# Patient Record
Sex: MALE | Race: WHITE | Employment: FULL TIME | URBAN - METROPOLITAN AREA
[De-identification: names, ages, dates, MRNs, and addresses within clinical notes are randomized per-mention and may not be internally consistent; named-entity substitution may affect disease eponyms.]

---

## 2019-06-14 ENCOUNTER — OFFICE VISIT (OUTPATIENT)
Dept: PRIMARY CARE CLINIC | Age: 29
End: 2019-06-14

## 2019-06-14 VITALS
WEIGHT: 203.4 LBS | BODY MASS INDEX: 27.55 KG/M2 | RESPIRATION RATE: 16 BRPM | DIASTOLIC BLOOD PRESSURE: 92 MMHG | TEMPERATURE: 98 F | HEIGHT: 72 IN | OXYGEN SATURATION: 95 % | SYSTOLIC BLOOD PRESSURE: 135 MMHG | HEART RATE: 76 BPM

## 2019-06-14 DIAGNOSIS — Z76.89 ENCOUNTER TO ESTABLISH CARE: ICD-10-CM

## 2019-06-14 DIAGNOSIS — F41.9 ANXIETY: Primary | ICD-10-CM

## 2019-06-14 DIAGNOSIS — Z88.9 H/O SEASONAL ALLERGIES: ICD-10-CM

## 2019-06-14 RX ORDER — CLONAZEPAM 0.25 MG/1
0.25 TABLET, ORALLY DISINTEGRATING ORAL
COMMUNITY
Start: 2019-04-08 | End: 2019-06-14 | Stop reason: SDUPTHER

## 2019-06-14 RX ORDER — ESCITALOPRAM OXALATE 20 MG/1
TABLET ORAL
Qty: 90 TAB | Refills: 1 | Status: SHIPPED | OUTPATIENT
Start: 2019-06-14 | End: 2020-06-12 | Stop reason: SDUPTHER

## 2019-06-14 RX ORDER — CLONAZEPAM 0.25 MG/1
TABLET, ORALLY DISINTEGRATING ORAL
Qty: 10 TAB | Refills: 0 | Status: SHIPPED | OUTPATIENT
Start: 2019-06-14 | End: 2020-03-19 | Stop reason: SDUPTHER

## 2019-06-14 RX ORDER — PROPRANOLOL HYDROCHLORIDE 10 MG/1
TABLET ORAL
COMMUNITY
Start: 2018-09-28 | End: 2020-03-19 | Stop reason: SDUPTHER

## 2019-06-14 RX ORDER — ESCITALOPRAM OXALATE 20 MG/1
TABLET ORAL
COMMUNITY
Start: 2018-01-08 | End: 2019-06-14 | Stop reason: SDUPTHER

## 2019-06-14 NOTE — PROGRESS NOTES
Chief Complaint   Patient presents with   [facility]     medication refill,  one is anxiety lexapro 20 mg

## 2019-06-14 NOTE — PROGRESS NOTES
[facility]   [address], [address] [facility]  P: [phone]  F: [phone]      Chief Complaint   Patient presents with   [facility]     medication refill,  one is anxiety lexapro 20 mg        SUBJECTIVE   Carmen Collado is a 34 y.o. male who presents to clinic for \[facility]\"" Care (medication refill,  one is anxiety lexapro 20 mg ). HPI:    Tom Hatch is a 34 yr old male who presents to [facility], with a hx of anxiety well managed on Lexapro 20mg and Clonopin . 25mg PRN for anxiety attack (only 2-3 pills monthly). He also takes propanolol 10mg tablet as needed for anxiety r/t public speaking. Previously followed by Donna Uribe with [facility] [place]. He provides his records today which confirm above medications. He states overall today that his anxiety is stable. He was previously followed by a  therapist in his early 25s and had cognitive behavioral therapy for about 6 to 8 months, but did not require any further therapy. He states his anxiety manifests as increased sweating, increased heart rate, and trouble with public speaking. He also notes anxiety when he is trapped in a small group with a lot of people. He has found that redirecting his thoughts is a good coping strategy for him. He denies further need for resources including therapy or psychiatry today. 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 Previously was in [place] and [place]. Non-smoker, drinks alcohol socially. He notes additionally has a history of seasonal allergies on daily Claritin, and uses Nasonex if he has sinus congestion. There are no active problems to display for this patient. History reviewed. No pertinent past medical history.   Past Surgical History:   Procedure Laterality Date    HX TONSIL AND ADENOIDECTOMY Bilateral      Social History     Socioeconomic History    Marital status: SINGLE     Spouse name: Not on file    Number of children: Not on file    Years of education: Not on file    Highest education level: Not on file   Occupational History    Not on file   Social Needs    Financial resource strain: Not on file    Food insecurity:     Worry: Not on file     Inability: Not on file    Transportation needs:     Medical: Not on file     Non-medical: Not on file   Tobacco Use    Smoking status: Former Smoker     Last attempt to quit: 2018     Years since quittin.9    Smokeless tobacco: Never Used   Substance and Sexual Activity    Alcohol use: Yes     Comment: twice a week    Drug use: Never    Sexual activity: Yes     Partners: Male     Birth control/protection: Condom   Lifestyle    Physical activity:     Days per week: Not on file     Minutes per session: Not on file    Stress: Not on file   Relationships    Social connections:     Talks on phone: Not on file     Gets together: Not on file     Attends Jewish service: Not on file     Active member of club or organization: Not on file     Attends meetings of clubs or organizations: Not on file     Relationship status: Not on file    Intimate partner violence:     Fear of current or ex partner: Not on file     Emotionally abused: Not on file     Physically abused: Not on file     Forced sexual activity: Not on file   Other Topics Concern    Not on file   Social History Narrative    Not on file     History reviewed. No pertinent family history. No Known Allergies    Current Outpatient Medications   Medication Sig Dispense Refill    propranolol (INDERAL) 10 mg tablet Take 1 -2 tablet(s) by oral route 1 hour prior to public speaking. May repeat every 6 hours.  clonazePAM (KLONOPIN) 0.25 mg disintegrating tablet Take 1 tab as needed for anxiety attack. Max dose . 5mg daily.  10 Tab 0    escitalopram oxalate (LEXAPRO) 20 mg tablet TAKE 1 TABLET BY MOUTH EVERY DAY  Indications: Repeated Episodes of Anxiety 90 Tab 1           The medications were reviewed and updated in the medical record. The past medical history, past surgical history, and family history were reviewed and updated in the medical record. REVIEW OF SYSTEMS   Review of Systems   Constitutional: Negative for malaise/fatigue. HENT: Negative for congestion. Eyes: Negative for blurred vision and pain. Respiratory: Negative for cough and shortness of breath. Cardiovascular: Negative for chest pain and palpitations. Gastrointestinal: Negative for abdominal pain and heartburn. Genitourinary: Negative for frequency and urgency. Musculoskeletal: Negative for joint pain and myalgias. Neurological: Negative for dizziness, tingling, sensory change, weakness and headaches. Psychiatric/Behavioral: Negative for depression, memory loss and substance abuse. PHYSICAL EXAM     Visit Vitals  BP (!) 135/92 (BP 1 Location: Left arm, BP Patient Position: Sitting)   Pulse 76   Temp 98 °F (36.7 °C) (Oral)   Resp 16   Ht 6' (1.829 m)   Wt 203 lb 6.4 oz (92.3 kg)   SpO2 95%   BMI 27.59 kg/m²     Physical Exam   Constitutional: He is oriented to person, place, and time and well-developed, well-nourished, and in no distress. BMI 27   HENT:   Head: Normocephalic and atraumatic. Right Ear: External ear normal.   Left Ear: External ear normal.   Cardiovascular: Normal rate, regular rhythm, S1 normal, S2 normal and normal heart sounds. Pulmonary/Chest: Effort normal and breath sounds normal.   Musculoskeletal: Normal range of motion. He exhibits no edema. Neurological: He is alert and oriented to person, place, and time. Gait normal.   Skin: Skin is warm and dry. Psychiatric: Mood, affect and judgment normal. He expresses no homicidal and no suicidal ideation. Nursing note and vitals reviewed. ASSESSMENT/ PLAN   Diagnoses and all orders for this visit:    1. Anxiety  -     clonazePAM (KLONOPIN) 0.25 mg disintegrating tablet; Take 1 tab as needed for anxiety attack. Max dose . 5mg daily.  -     escitalopram oxalate (LEXAPRO) 20 mg tablet; TAKE 1 TABLET BY MOUTH EVERY DAY  Indications: Repeated Episodes of Anxiety  -Encouraged him to let me know if his anxiety is worsening, as I have multiple resources to refer him to for psychiatry and counseling. Patient declines resources at this time. 2. Encounter to establish care     -Return for full physical with fasting labs    3. H/O seasonal allergies    -Continue to use Claritin and Nasonex    Monitor blood pressure as slightly elevated today 135/92. Disclaimer:  Advised patient to call back or return to office if symptoms worsen/change/persist.  Discussed expected course/resolution/complications of diagnosis in detail with patient.     Medication risks/benefits/alternatives discussed with patient. Patient was given an after visit summary which includes diagnoses, current medications, & vitals.      Discussed patient instructions and advised to read to all patient instructions regarding care.      Patient expressed understanding with the diagnosis and plan. This note will not be viewable in [address]         Haroldo Rodriguez NP  6/14/2019        (This document has been electronically signed)

## 2020-01-13 ENCOUNTER — OFFICE VISIT (OUTPATIENT)
Dept: PRIMARY CARE CLINIC | Age: 30
End: 2020-01-13

## 2020-01-13 VITALS
HEIGHT: 72 IN | OXYGEN SATURATION: 95 % | TEMPERATURE: 98.5 F | HEART RATE: 77 BPM | SYSTOLIC BLOOD PRESSURE: 125 MMHG | DIASTOLIC BLOOD PRESSURE: 92 MMHG | RESPIRATION RATE: 16 BRPM | WEIGHT: 200 LBS | BODY MASS INDEX: 27.09 KG/M2

## 2020-01-13 DIAGNOSIS — H92.01 RIGHT EAR PAIN: Primary | ICD-10-CM

## 2020-01-13 RX ORDER — CIPROFLOXACIN AND DEXAMETHASONE 3; 1 MG/ML; MG/ML
SUSPENSION/ DROPS AURICULAR (OTIC)
Qty: 7.5 ML | Refills: 0 | Status: SHIPPED | OUTPATIENT
Start: 2020-01-13 | End: 2021-03-09 | Stop reason: ALTCHOICE

## 2020-01-13 NOTE — PROGRESS NOTES
Chief Complaint   Patient presents with    Ear Pain     right ear and it has been goingf on for about 48 hours. was on the outer ear and now it is a ache inside     1. Have you been to an emergency room, urgent clinic, or hospitalized since your last visit? NO  If yes, where when, and reason for visit? 2. Have seen or consulted any other health care provider since your last visit? NO  Please include any pap smears or colon screening in this section  If yes, where when, and reason for visit? 3. Have you had any blood work or xray, including a mammogram since your last visit NO  If yes, where when, and reason for visit? 4. Are there any changes in medications including immunizations since your last visit? YES  If yes, where when, and reason for visit? Flu shot at work    5. Would you like to speak with a health care team member about safety in your home? NO    6. Do you have an Advanced Directive/ Living Will in place?  NO  If yes, do we have a copy on file NO  If no, would you like information NO

## 2020-01-13 NOTE — PROGRESS NOTES
[facility]   [address], [address] [facility]  P: [phone]  F: [phone]      Chief Complaint   Patient presents with    Ear Pain     right ear and it has been goingf on for about 48 hours. was on the outer ear and now it is a ache inside       SUBJECTIVE   Cong Powell is a 34 y.o. male who presents to clinic for Ear Pain (right ear and it has been goingf on for about 48 hours. was on the outer ear and now it is a ache inside). HPI:    Mikhail Velasquez is a 34 yr old male who presents with 2 days of ear- ache. He denies any other associated symptoms today. He reports he tried 6 ibuprofen 400 mg with relief in pain. He notes a prior history of allergies, and reports he stayed at his parents over the holidays who have a wood-burning stove. There are no active problems to display for this patient. History reviewed. No pertinent past medical history.   Past Surgical History:   Procedure Laterality Date    HX TONSIL AND ADENOIDECTOMY Bilateral      Social History     Socioeconomic History    Marital status: SINGLE     Spouse name: Not on file    Number of children: Not on file    Years of education: Not on file    Highest education level: Not on file   Occupational History    Not on file   Social Needs    Financial resource strain: Not on file    Food insecurity:     Worry: Not on file     Inability: Not on file    Transportation needs:     Medical: Not on file     Non-medical: Not on file   Tobacco Use    Smoking status: Former Smoker     Last attempt to quit: 2018     Years since quittin.5    Smokeless tobacco: Never Used   Substance and Sexual Activity    Alcohol use: Yes     Comment: twice a week    Drug use: Never    Sexual activity: Yes     Partners: Male     Birth control/protection: Condom   Lifestyle    Physical activity:     Days per week: Not on file     Minutes per session: Not on file    Stress: Not on file   Relationships    Social connections:     Talks on phone: Not on file     Gets together: Not on file     Attends Sikhism service: Not on file     Active member of club or organization: Not on file     Attends meetings of clubs or organizations: Not on file     Relationship status: Not on file    Intimate partner violence:     Fear of current or ex partner: Not on file     Emotionally abused: Not on file     Physically abused: Not on file     Forced sexual activity: Not on file   Other Topics Concern    Not on file   Social History Narrative    Not on file     History reviewed. No pertinent family history. No Known Allergies    Current Outpatient Medications   Medication Sig Dispense Refill    ciprofloxacin-dexamethasone (CIPRODEX) 0.3-0.1 % otic suspension 3-4 drops twice daily for one week. 7.5 mL 0    propranolol (INDERAL) 10 mg tablet Take 1 -2 tablet(s) by oral route 1 hour prior to public speaking. May repeat every 6 hours.  escitalopram oxalate (LEXAPRO) 20 mg tablet TAKE 1 TABLET BY MOUTH EVERY DAY  Indications: Repeated Episodes of Anxiety 90 Tab 1    clonazePAM (KLONOPIN) 0.25 mg disintegrating tablet Take 1 tab as needed for anxiety attack. Max dose . 5mg daily. 10 Tab 0           The medications were reviewed and updated in the medical record. The past medical history, past surgical history, and family history were reviewed and updated in the medical record. REVIEW OF SYSTEMS   Review of Systems   Constitutional: Negative for malaise/fatigue. HENT: Positive for ear pain. Negative for congestion. Eyes: Negative for blurred vision and pain. Respiratory: Negative for cough and shortness of breath. Cardiovascular: Negative for chest pain and palpitations. Gastrointestinal: Negative for abdominal pain and heartburn. Genitourinary: Negative for frequency and urgency. Musculoskeletal: Negative for joint pain and myalgias.    Neurological: Negative for dizziness, tingling, sensory change, weakness and headaches. Psychiatric/Behavioral: Negative for depression, memory loss and substance abuse. PHYSICAL EXAM     Visit Vitals  BP (!) 125/92 (BP 1 Location: Left arm, BP Patient Position: Sitting)   Pulse 77   Temp 98.5 °F (36.9 °C) (Oral)   Resp 16   Ht 6' (1.829 m)   Wt 200 lb (90.7 kg)   SpO2 95%   BMI 27.12 kg/m²       Physical Exam  Vitals signs and nursing note reviewed. HENT:      Head: Normocephalic and atraumatic. Right Ear: External ear normal. Swelling (canal) present. Tympanic membrane is not erythematous. Left Ear: Hearing, tympanic membrane, ear canal and external ear normal.   Cardiovascular:      Rate and Rhythm: Normal rate and regular rhythm. Heart sounds: Normal heart sounds. Pulmonary:      Effort: Pulmonary effort is normal.      Breath sounds: Normal breath sounds. Musculoskeletal: Normal range of motion. Skin:     General: Skin is warm and dry. Neurological:      Mental Status: He is alert and oriented to person, place, and time. Gait: Gait is intact. Psychiatric:         Mood and Affect: Affect normal.         Judgment: Judgment normal.       ASSESSMENT/ PLAN   Diagnoses and all orders for this visit:    1. Right ear pain  -     ciprofloxacin-dexamethasone (CIPRODEX) 0.3-0.1 % otic suspension; 3-4 drops twice daily for one week. -Start antihistamine like Zyrtec or Claritin. Discussed if not improving, may require oral antibiotics. Disclaimer:  Advised patient to call back or return to office if symptoms worsen/change/persist.  Discussed expected course/resolution/complications of diagnosis in detail with patient.     Medication risks/benefits/alternatives discussed with patient. Patient was given an after visit summary which includes diagnoses, current medications, & vitals.      Discussed patient instructions and advised to read to all patient instructions regarding care.      Patient expressed understanding with the diagnosis and plan.    This note will not be viewable in [address]         Kathy Muhammad, KENNEDI  1/13/2020        (This document has been electronically signed)

## 2020-01-15 DIAGNOSIS — H66.91 RIGHT OTITIS MEDIA, UNSPECIFIED OTITIS MEDIA TYPE: Primary | ICD-10-CM

## 2020-01-15 RX ORDER — AMOXICILLIN 875 MG/1
875 TABLET, FILM COATED ORAL 2 TIMES DAILY
Qty: 14 TAB | Refills: 0 | Status: SHIPPED | OUTPATIENT
Start: 2020-01-15 | End: 2020-01-16 | Stop reason: ALTCHOICE

## 2020-01-16 ENCOUNTER — OFFICE VISIT (OUTPATIENT)
Dept: PRIMARY CARE CLINIC | Age: 30
End: 2020-01-16

## 2020-01-16 VITALS
HEIGHT: 72 IN | SYSTOLIC BLOOD PRESSURE: 130 MMHG | BODY MASS INDEX: 27.79 KG/M2 | OXYGEN SATURATION: 96 % | HEART RATE: 79 BPM | TEMPERATURE: 98.6 F | DIASTOLIC BLOOD PRESSURE: 92 MMHG | WEIGHT: 205.2 LBS | RESPIRATION RATE: 16 BRPM

## 2020-01-16 DIAGNOSIS — H92.01 RIGHT EAR PAIN: Primary | ICD-10-CM

## 2020-01-16 DIAGNOSIS — H91.91 DECREASED HEARING OF RIGHT EAR: ICD-10-CM

## 2020-01-16 RX ORDER — AMOXICILLIN AND CLAVULANATE POTASSIUM 875; 125 MG/1; MG/1
1 TABLET, FILM COATED ORAL EVERY 12 HOURS
Qty: 20 TAB | Refills: 0 | Status: SHIPPED | OUTPATIENT
Start: 2020-01-16 | End: 2020-01-26

## 2020-01-16 NOTE — PROGRESS NOTES
[facility]   [address], [address] [facility]  P: [phone]  F: [phone]      Chief Complaint   Patient presents with    Ear Pain     did receive medication for his ear pain how ever he cannot hear out of the right ear.  states that now there is discharge and wants it to be looked at        [address] is a 34 y.o. male who presents to clinic for Ear Pain (did receive medication for his ear pain how ever he cannot hear out of the right ear.  states that now there is discharge and wants it to be looked at ). HPI:   José Rossi is a 80-year-old male who presents today for 5 days of right-sided ear pain. He reports that Ciprodex drops and oral amoxicillin have not helped his right ear. He notes swelling, decreased hearing, and drainage to his right ear. There are no active problems to display for this patient. History reviewed. No pertinent past medical history.   Past Surgical History:   Procedure Laterality Date    HX TONSIL AND ADENOIDECTOMY Bilateral      Social History     Socioeconomic History    Marital status: SINGLE     Spouse name: Not on file    Number of children: Not on file    Years of education: Not on file    Highest education level: Not on file   Occupational History    Not on file   Social Needs    Financial resource strain: Not on file    Food insecurity:     Worry: Not on file     Inability: Not on file    Transportation needs:     Medical: Not on file     Non-medical: Not on file   Tobacco Use    Smoking status: Former Smoker     Last attempt to quit: 2018     Years since quittin.5    Smokeless tobacco: Never Used   Substance and Sexual Activity    Alcohol use: Yes     Comment: twice a week    Drug use: Never    Sexual activity: Yes     Partners: Male     Birth control/protection: Condom   Lifestyle    Physical activity:     Days per week: Not on file     Minutes per session: Not on file    Stress: Not on file Relationships    Social connections:     Talks on phone: Not on file     Gets together: Not on file     Attends Faith service: Not on file     Active member of club or organization: Not on file     Attends meetings of clubs or organizations: Not on file     Relationship status: Not on file    Intimate partner violence:     Fear of current or ex partner: Not on file     Emotionally abused: Not on file     Physically abused: Not on file     Forced sexual activity: Not on file   Other Topics Concern    Not on file   Social History Narrative    Not on file     History reviewed. No pertinent family history. No Known Allergies    Current Outpatient Medications   Medication Sig Dispense Refill    amoxicillin-clavulanate (AUGMENTIN) 875-125 mg per tablet Take 1 Tab by mouth every twelve (12) hours for 10 days. 20 Tab 0    ciprofloxacin-dexamethasone (CIPRODEX) 0.3-0.1 % otic suspension 3-4 drops twice daily for one week. 7.5 mL 0    propranolol (INDERAL) 10 mg tablet Take 1 -2 tablet(s) by oral route 1 hour prior to public speaking. May repeat every 6 hours.  clonazePAM (KLONOPIN) 0.25 mg disintegrating tablet Take 1 tab as needed for anxiety attack. Max dose . 5mg daily. 10 Tab 0    escitalopram oxalate (LEXAPRO) 20 mg tablet TAKE 1 TABLET BY MOUTH EVERY DAY  Indications: Repeated Episodes of Anxiety 90 Tab 1           The medications were reviewed and updated in the medical record. The past medical history, past surgical history, and family history were reviewed and updated in the medical record. REVIEW OF SYSTEMS   Review of Systems   Constitutional: Negative for malaise/fatigue. HENT: Positive for ear discharge, ear pain and hearing loss. Negative for congestion. Eyes: Negative for blurred vision and pain. Respiratory: Negative for cough and shortness of breath. Cardiovascular: Negative for chest pain and palpitations. Gastrointestinal: Negative for abdominal pain and heartburn. Genitourinary: Negative for frequency and urgency. Musculoskeletal: Negative for joint pain and myalgias. Neurological: Negative for dizziness, tingling, sensory change, weakness and headaches. Psychiatric/Behavioral: Negative for depression, memory loss and substance abuse. PHYSICAL EXAM     Visit Vitals  BP (!) 130/92 (BP 1 Location: Left arm, BP Patient Position: Sitting)   Pulse 79   Temp 98.6 °F (37 °C) (Oral)   Resp 16   Ht 6' (1.829 m)   Wt 205 lb 3.2 oz (93.1 kg)   SpO2 96%   BMI 27.83 kg/m²       Physical Exam  Vitals signs and nursing note reviewed. HENT:      Head: Normocephalic and atraumatic. Right Ear: External ear normal. Decreased hearing noted. Drainage and swelling present. Left Ear: Hearing, tympanic membrane, ear canal and external ear normal.      Ears:      Comments: Unable to view right TM due to swelling, and drainage. Cardiovascular:      Rate and Rhythm: Normal rate and regular rhythm. Heart sounds: Normal heart sounds. Pulmonary:      Effort: Pulmonary effort is normal.      Breath sounds: Normal breath sounds. Musculoskeletal: Normal range of motion. Skin:     General: Skin is warm and dry. Neurological:      Mental Status: He is alert and oriented to person, place, and time. Gait: Gait is intact. Psychiatric:         Mood and Affect: Affect normal.         Judgment: Judgment normal.         ASSESSMENT/ PLAN   Diagnoses and all orders for this visit:    1. Right ear pain  -     REFERRAL TO ENT-OTOLARYNGOLOGY  -     amoxicillin-clavulanate (AUGMENTIN) 875-125 mg per tablet; Take 1 Tab by mouth every twelve (12) hours for 10 days.  -Continue Ciprodex eardrops. Schedule appointment with ENT if not improving on Augmentin. 2. Decreased hearing of right ear  -     REFERRAL TO ENT-OTOLARYNGOLOGY  -     amoxicillin-clavulanate (AUGMENTIN) 875-125 mg per tablet; Take 1 Tab by mouth every twelve (12) hours for 10 days.       Disclaimer:  Advised patient to call back or return to office if symptoms worsen/change/persist.  Discussed expected course/resolution/complications of diagnosis in detail with patient.     Medication risks/benefits/alternatives discussed with patient. Patient was given an after visit summary which includes diagnoses, current medications, & vitals.      Discussed patient instructions and advised to read to all patient instructions regarding care.      Patient expressed understanding with the diagnosis and plan. This note will not be viewable in [address]         Alexander Oliveira NP  1/16/2020        (This document has been electronically signed)

## 2020-01-17 ENCOUNTER — TELEPHONE (OUTPATIENT)
Dept: PRIMARY CARE CLINIC | Age: 30
End: 2020-01-17

## 2020-01-17 NOTE — TELEPHONE ENCOUNTER
Patient wants to speak to a nurse, stated he was referred to an ent and is unable to see them for a lengthy amount of time.  Would like to know what to do

## 2020-03-19 DIAGNOSIS — F41.9 ANXIETY: ICD-10-CM

## 2020-03-19 RX ORDER — PROPRANOLOL HYDROCHLORIDE 10 MG/1
TABLET ORAL
Qty: 60 TAB | Refills: 1 | Status: SHIPPED | OUTPATIENT
Start: 2020-03-19 | End: 2022-09-23 | Stop reason: SDUPTHER

## 2020-03-19 RX ORDER — CLONAZEPAM 0.25 MG/1
TABLET, ORALLY DISINTEGRATING ORAL
Qty: 10 TAB | Refills: 0 | Status: SHIPPED | OUTPATIENT
Start: 2020-03-19 | End: 2021-01-25 | Stop reason: SDUPTHER

## 2020-03-19 NOTE — TELEPHONE ENCOUNTER
LOV: 01/16/2020  Labs: None   Refill: 06/14/2019-Clonazepam            09/28/2018- Propranolol (?)-anxiety  Next Appt: MARLA

## 2020-03-19 NOTE — TELEPHONE ENCOUNTER
----- Message from Constantino Rosenthal sent at 3/18/2020  9:03 PM EDT -----  Regarding: NP, Jorge/Refill  Medication Refill    Caller (if not patient):  Roselia Delatorre      Relationship of caller (if not patient):  Self      Best contact number(s):  [phone]      Name of medication and dosage if known:  Clonazepam, Propranolol      Is patient out of this medication (yes/no):  Yes      Pharmacy name:  [address] [place] [address] Ariel listed in chart? (yes/no):  Pharmacy phone number:  [phone]      Details to clarify the request:  Pt is requesting a refill for Clonazepam and Propranolol to be sent to the [facility].     Thanks,  Constantino Rosenthal

## 2020-06-12 DIAGNOSIS — F41.9 ANXIETY: ICD-10-CM

## 2020-06-12 RX ORDER — ESCITALOPRAM OXALATE 20 MG/1
TABLET ORAL
Qty: 90 TAB | Refills: 1 | Status: SHIPPED | OUTPATIENT
Start: 2020-06-12 | End: 2021-03-08 | Stop reason: SDUPTHER

## 2020-08-01 ENCOUNTER — E-VISIT (OUTPATIENT)
Dept: PRIMARY CARE CLINIC | Age: 30
End: 2020-08-01

## 2020-08-01 DIAGNOSIS — H66.90 ACUTE OTITIS MEDIA, UNSPECIFIED OTITIS MEDIA TYPE: Primary | ICD-10-CM

## 2020-08-03 RX ORDER — AMOXICILLIN AND CLAVULANATE POTASSIUM 875; 125 MG/1; MG/1
1 TABLET, FILM COATED ORAL 2 TIMES DAILY
Qty: 20 TAB | Refills: 0 | Status: SHIPPED | OUTPATIENT
Start: 2020-08-03 | End: 2020-08-13

## 2020-08-03 NOTE — TELEPHONE ENCOUNTER
HPI: per Dante Nieto's questionnaire submitted for electronic visit. Physical Exam: not applicable. Diagnoses and all orders for this visit:    1. Acute otitis media, unspecified otitis media type  -     amoxicillin-clavulanate (AUGMENTIN) 875-125 mg per tablet; Take 1 Tab by mouth two (2) times a day for 10 days. Disclaimer:  Advised patient to call back or return to office if symptoms worsen/change/persist.    Electronic Visit for [facility] (billing codes 79944, medicare only ): 5-10 minutes were spent on the digital evaluation and management of this patient.      Edward Marte NP

## 2020-08-04 DIAGNOSIS — H66.90 ACUTE OTITIS MEDIA, UNSPECIFIED OTITIS MEDIA TYPE: Primary | ICD-10-CM

## 2020-08-05 DIAGNOSIS — H66.90 ACUTE OTITIS MEDIA, UNSPECIFIED OTITIS MEDIA TYPE: Primary | ICD-10-CM

## 2021-01-26 ENCOUNTER — VIRTUAL VISIT (OUTPATIENT)
Dept: PRIMARY CARE CLINIC | Age: 31
End: 2021-01-26

## 2021-01-26 DIAGNOSIS — Z79.899 ON PRE-EXPOSURE PROPHYLAXIS FOR HIV: Primary | ICD-10-CM

## 2021-01-26 DIAGNOSIS — F41.9 ANXIETY: ICD-10-CM

## 2021-01-26 DIAGNOSIS — L65.9 HAIR LOSS: ICD-10-CM

## 2021-01-26 PROCEDURE — 99214 OFFICE O/P EST MOD 30 MIN: CPT | Performed by: NURSE PRACTITIONER

## 2021-01-26 RX ORDER — EMTRICITABINE AND TENOFOVIR DISOPROXIL FUMARATE 200; 300 MG/1; MG/1
1 TABLET, FILM COATED ORAL DAILY
Qty: 90 TAB | Refills: 0 | Status: SHIPPED | OUTPATIENT
Start: 2021-01-26 | End: 2022-09-23

## 2021-01-26 NOTE — PROGRESS NOTES
[facility]   [address]jameel [address], [address] [facility]  P: [phone]  F: [phone]    SUNNI Tena is a 31 y.o. male who is seen over telehealth for Follow-up and Request For New Medication. He reached out yesterday for refill of his Klonopin, which he uses sparingly for panic attacks. He is currently on Lexapro 20 mg tablet daily and propranolol as needed for anxiety. His Klonopin was refilled by me yesterday, he only takes 2-3 tabs a month per previous discussions. He presents today to discuss starting prep. He would prefer generic Truvada. He is agreeable to lab work including HIV check prior to starting the medication. There are no active problems to display for this patient. No past medical history on file.   Past Surgical History:   Procedure Laterality Date    HX TONSIL AND ADENOIDECTOMY Bilateral      Social History     Socioeconomic History    Marital status: SINGLE     Spouse name: Not on file    Number of children: Not on file    Years of education: Not on file    Highest education level: Not on file   Occupational History    Not on file   Social Needs    Financial resource strain: Not on file    Food insecurity     Worry: Not on file     Inability: Not on file    Transportation needs     Medical: Not on file     Non-medical: Not on file   Tobacco Use    Smoking status: Former Smoker     Quit date: 2018     Years since quittin.5    Smokeless tobacco: Never Used   Substance and Sexual Activity    Alcohol use: Yes     Comment: twice a week    Drug use: Never    Sexual activity: Yes     Partners: Male     Birth control/protection: Condom   Lifestyle    Physical activity     Days per week: Not on file     Minutes per session: Not on file    Stress: Not on file   Relationships    Social connections     Talks on phone: Not on file     Gets together: Not on file     Attends Sabianism service: Not on file     Active member of club or organization: Not on file     Attends meetings of clubs or organizations: Not on file     Relationship status: Not on file    Intimate partner violence     Fear of current or ex partner: Not on file     Emotionally abused: Not on file     Physically abused: Not on file     Forced sexual activity: Not on file   Other Topics Concern    Not on file   Social History Narrative    Not on file     No family history on file. No Known Allergies    Current Outpatient Medications   Medication Sig Dispense Refill    emtricitabine-tenofovir, TDF, (TRUVADA) 200-300 mg per tablet Take 1 Tab by mouth daily. 90 Tab 0    clonazePAM (KlonoPIN) 0.25 mg TbDi Take 1 tab as needed for anxiety attack. Max dose . 5mg daily. 15 Tab 0    benzocaine-zinc 5-0.1 % soln 5 ml to ear as needed for ear pain. 30 mL 0    escitalopram oxalate (LEXAPRO) 20 mg tablet TAKE 1 TABLET BY MOUTH EVERY DAY  Indications: repeated episodes of anxiety 90 Tab 1    propranoloL (INDERAL) 10 mg tablet Take 1 -2 tablet(s) by oral route 1 hour prior to public speaking. May repeat every 6 hours. 60 Tab 1    ciprofloxacin-dexamethasone (CIPRODEX) 0.3-0.1 % otic suspension 3-4 drops twice daily for one week. 7.5 mL 0           The medications were reviewed and updated in the medical record. The past medical history, past surgical history, and family history were reviewed and updated in the medical record. REVIEW OF SYSTEMS   Review of Systems   Constitutional: Negative for malaise/fatigue. HENT: Negative for congestion. Eyes: Negative for blurred vision and pain. Respiratory: Negative for cough and shortness of breath. Cardiovascular: Negative for chest pain and palpitations. Gastrointestinal: Negative for abdominal pain and heartburn. Genitourinary: Negative for frequency and urgency. Musculoskeletal: Negative for joint pain and myalgias. Neurological: Negative for dizziness, tingling, sensory change, weakness and headaches. Psychiatric/Behavioral: Negative for depression, memory loss and substance abuse. PHYSICAL EXAM   NO VITALS WERE TAKEN FOR THIS VISIT    Physical Exam  Vitals signs and nursing note reviewed. Constitutional:       Appearance: Normal appearance. HENT:      Head: Normocephalic and atraumatic. Right Ear: External ear normal.      Left Ear: External ear normal.   Pulmonary:      Effort: Pulmonary effort is normal.   Musculoskeletal: Normal range of motion. Skin:     General: Skin is warm and dry. Neurological:      Mental Status: He is alert and oriented to person, place, and time. Mental status is at baseline. Gait: Gait is intact. Psychiatric:         Mood and Affect: Affect normal.         Speech: Speech normal.         Thought Content: Thought content normal.         Judgment: Judgment normal.       ASSESSMENT/ PLAN   Diagnoses and all orders for this visit:    1. On pre-exposure prophylaxis for HIV  -     HIV 1/2 AG/AB, 4TH GENERATION,W RFLX CONFIRM; Future  -     HEPATITIS PANEL, ACUTE; Future  -     CBC WITH AUTOMATED DIFF; Future  -     METABOLIC PANEL, COMPREHENSIVE; Future  -     emtricitabine-tenofovir, TDF, (TRUVADA) 200-300 mg per tablet; Take 1 Tab by mouth daily.  -     HEP B SURFACE AG; Future  -We discussed pending above lab work, may resume Truvada. Will require q. 90-day HIV check for further med refill. Consider vitamin D supplementation on Truvada. 2. Hair loss  -     TSH 3RD GENERATION; Future    3. Anxiety  -     TSH 3RD GENERATION; Future  -     CBC WITH AUTOMATED DIFF; Future  -Lexapro daily, propranolol as needed, Klonopin sparingly. No further needs today. I was at home while conducting this encounter. Consent:  He and/or his healthcare decision maker is aware that this patient-initiated Telehealth encounter is a billable service, with coverage as determined by his insurance carrier.  He is aware that he may receive a bill and has provided verbal consent to proceed: Yes    This virtual visit was conducted via [employer]. Pursuant to the emergency declaration under the [facility], [facility] waiver authority and the [employer] and [employer] Act, this Virtual  Visit was conducted to reduce the patient's risk of exposure to COVID-19 and provide continuity of care for an established patient. Services were provided through a video synchronous discussion virtually to substitute for in-person clinic visit. Due to this being a TeleHealth evaluation, many elements of the physical examination are unable to be assessed. Total Time: minutes: 21-30 minutes. Disclaimer:  Advised patient to call back or return to office if symptoms worsen/change/persist.  Discussed expected course/resolution/complications of diagnosis in detail with patient. Medication risks/benefits/alternatives discussed with patient. Patient was given an after visit summary which includes diagnoses, current medications, & vitals. Discussed patient instructions and advised to read to all patient instructions regarding care. Patient expressed understanding with the diagnosis and plan. This note will not be viewable in [address]         Gabrielle Mercado NP  1/26/2021        (This document has been electronically signed)

## 2021-01-27 LAB
ALBUMIN SERPL-MCNC: 5.1 G/DL (ref 4.1–5.2)
ALBUMIN/GLOB SERPL: 2.3 {RATIO} (ref 1.2–2.2)
ALP SERPL-CCNC: 84 IU/L (ref 39–117)
ALT SERPL-CCNC: 24 IU/L (ref 0–44)
AST SERPL-CCNC: 22 IU/L (ref 0–40)
BASOPHILS # BLD AUTO: 0 X10E3/UL (ref 0–0.2)
BASOPHILS NFR BLD AUTO: 1 %
BILIRUB SERPL-MCNC: 0.4 MG/DL (ref 0–1.2)
BUN SERPL-MCNC: 11 MG/DL (ref 6–20)
BUN/CREAT SERPL: 11 (ref 9–20)
CALCIUM SERPL-MCNC: 9.9 MG/DL (ref 8.7–10.2)
CHLORIDE SERPL-SCNC: 102 MMOL/L (ref 96–106)
CO2 SERPL-SCNC: 25 MMOL/L (ref 20–29)
CREAT SERPL-MCNC: 1.04 MG/DL (ref 0.76–1.27)
EOSINOPHIL # BLD AUTO: 0 X10E3/UL (ref 0–0.4)
EOSINOPHIL NFR BLD AUTO: 1 %
ERYTHROCYTE [DISTWIDTH] IN BLOOD BY AUTOMATED COUNT: 12.1 % (ref 11.6–15.4)
GLOBULIN SER CALC-MCNC: 2.2 G/DL (ref 1.5–4.5)
GLUCOSE SERPL-MCNC: 106 MG/DL (ref 65–99)
HAV IGM SERPL QL IA: NEGATIVE
HBV CORE IGM SERPL QL IA: NEGATIVE
HBV SURFACE AG SERPL QL IA: NEGATIVE
HCT VFR BLD AUTO: 47.7 % (ref 37.5–51)
HCV AB S/CO SERPL IA: <0.1 S/CO RATIO (ref 0–0.9)
HGB BLD-MCNC: 16.3 G/DL (ref 13–17.7)
HIV 1+2 AB+HIV1 P24 AG SERPL QL IA: NON REACTIVE
IMM GRANULOCYTES # BLD AUTO: 0 X10E3/UL (ref 0–0.1)
IMM GRANULOCYTES NFR BLD AUTO: 0 %
LYMPHOCYTES # BLD AUTO: 1.6 X10E3/UL (ref 0.7–3.1)
LYMPHOCYTES NFR BLD AUTO: 31 %
MCH RBC QN AUTO: 32.2 PG (ref 26.6–33)
MCHC RBC AUTO-ENTMCNC: 34.2 G/DL (ref 31.5–35.7)
MCV RBC AUTO: 94 FL (ref 79–97)
MONOCYTES # BLD AUTO: 0.5 X10E3/UL (ref 0.1–0.9)
MONOCYTES NFR BLD AUTO: 9 %
NEUTROPHILS # BLD AUTO: 2.9 X10E3/UL (ref 1.4–7)
NEUTROPHILS NFR BLD AUTO: 58 %
PLATELET # BLD AUTO: 267 X10E3/UL (ref 150–450)
POTASSIUM SERPL-SCNC: 4.3 MMOL/L (ref 3.5–5.2)
PROT SERPL-MCNC: 7.3 G/DL (ref 6–8.5)
RBC # BLD AUTO: 5.06 X10E6/UL (ref 4.14–5.8)
SODIUM SERPL-SCNC: 143 MMOL/L (ref 134–144)
TSH SERPL DL<=0.005 MIU/L-ACNC: 0.82 UIU/ML (ref 0.45–4.5)
WBC # BLD AUTO: 5.1 X10E3/UL (ref 3.4–10.8)

## 2021-02-02 ENCOUNTER — VIRTUAL VISIT (OUTPATIENT)
Dept: PRIMARY CARE CLINIC | Age: 31
End: 2021-02-02
Payer: MEDICAID

## 2021-02-02 DIAGNOSIS — Z72.0 CURRENT EVERY DAY NICOTINE VAPING: Primary | ICD-10-CM

## 2021-02-02 DIAGNOSIS — F41.9 ANXIETY: ICD-10-CM

## 2021-02-02 PROCEDURE — 99213 OFFICE O/P EST LOW 20 MIN: CPT | Performed by: NURSE PRACTITIONER

## 2021-02-02 RX ORDER — CLONAZEPAM 0.25 MG/1
TABLET, ORALLY DISINTEGRATING ORAL
Qty: 15 TAB | Refills: 0 | Status: SHIPPED | OUTPATIENT
Start: 2021-02-02 | End: 2022-09-23 | Stop reason: SDUPTHER

## 2021-02-02 RX ORDER — IBUPROFEN 200 MG
1 TABLET ORAL EVERY 24 HOURS
Qty: 15 PATCH | Refills: 0 | Status: SHIPPED | OUTPATIENT
Start: 2021-02-02 | End: 2021-02-17

## 2021-02-02 NOTE — PATIENT INSTRUCTIONS
The patient was counseled on the dangers of tobacco use, and was advised to quit. Reviewed strategies to maximize success, including removing cigarettes and smoking materials from environment, stress management, substitution of other forms of reinforcement, support of family/friends and written materials. Smoking Cessation Referral Information: 
 
[employer]uit Now [place]: 1-800-QUIT-NOW or 7-960.759.3284 [email or web address]

## 2021-02-02 NOTE — PROGRESS NOTES
[facility]   [address] Rowell [address], [facility]  P: [phone]  F: [phone]    SUBJECTIVE   Lisha Navarro is a 31 y.o. male who is seen over telehealth for Nicotine Dependence, reports using Juul vaping device daily. He is interested in quitting. He has not tried any nicotine replacement products or prescription medication previously for smoking cessation. At his prior visit, his as needed clonazepam was sent to the wrong pharmacy, he is requesting it to be switched today. There are no active problems to display for this patient. No past medical history on file.   Past Surgical History:   Procedure Laterality Date    HX TONSIL AND ADENOIDECTOMY Bilateral      Social History     Socioeconomic History    Marital status: SINGLE     Spouse name: Not on file    Number of children: Not on file    Years of education: Not on file    Highest education level: Not on file   Occupational History    Not on file   Social Needs    Financial resource strain: Not on file    Food insecurity     Worry: Not on file     Inability: Not on file    Transportation needs     Medical: Not on file     Non-medical: Not on file   Tobacco Use    Smoking status: Former Smoker     Quit date: 2018     Years since quittin.5    Smokeless tobacco: Never Used   Substance and Sexual Activity    Alcohol use: Yes     Comment: twice a week    Drug use: Never    Sexual activity: Yes     Partners: Male     Birth control/protection: Condom   Lifestyle    Physical activity     Days per week: Not on file     Minutes per session: Not on file    Stress: Not on file   Relationships    Social connections     Talks on phone: Not on file     Gets together: Not on file     Attends Adventism service: Not on file     Active member of club or organization: Not on file     Attends meetings of clubs or organizations: Not on file     Relationship status: Not on file    Intimate partner violence     Fear of current or ex partner: Not on file     Emotionally abused: Not on file     Physically abused: Not on file     Forced sexual activity: Not on file   Other Topics Concern    Not on file   Social History Narrative    Not on file     No family history on file. No Known Allergies    Current Outpatient Medications   Medication Sig Dispense Refill    nicotine (NICODERM CQ) 14 mg/24 hr patch 1 Patch by TransDERmal route every twenty-four (24) hours for 15 days. 15 Patch 0    clonazePAM (KlonoPIN) 0.25 mg TbDi Take 1 tab as needed for anxiety attack. Max dose . 5mg daily. 15 Tab 0    emtricitabine-tenofovir, TDF, (TRUVADA) 200-300 mg per tablet Take 1 Tab by mouth daily. 90 Tab 0    benzocaine-zinc 5-0.1 % soln 5 ml to ear as needed for ear pain. 30 mL 0    escitalopram oxalate (LEXAPRO) 20 mg tablet TAKE 1 TABLET BY MOUTH EVERY DAY  Indications: repeated episodes of anxiety 90 Tab 1    propranoloL (INDERAL) 10 mg tablet Take 1 -2 tablet(s) by oral route 1 hour prior to public speaking. May repeat every 6 hours. 60 Tab 1    ciprofloxacin-dexamethasone (CIPRODEX) 0.3-0.1 % otic suspension 3-4 drops twice daily for one week. 7.5 mL 0           The medications were reviewed and updated in the medical record. The past medical history, past surgical history, and family history were reviewed and updated in the medical record. REVIEW OF SYSTEMS   Review of Systems   Constitutional: Negative for malaise/fatigue. HENT: Negative for congestion. Eyes: Negative for blurred vision and pain. Respiratory: Negative for cough and shortness of breath. Cardiovascular: Negative for chest pain and palpitations. Gastrointestinal: Negative for abdominal pain and heartburn. Genitourinary: Negative for frequency and urgency. Musculoskeletal: Negative for joint pain and myalgias. Neurological: Negative for dizziness, tingling, sensory change, weakness and headaches.    Psychiatric/Behavioral: Negative for depression, memory loss and substance abuse. PHYSICAL EXAM   NO VITALS WERE TAKEN FOR THIS VISIT    Physical Exam  Vitals signs and nursing note reviewed. Constitutional:       Appearance: Normal appearance. HENT:      Head: Normocephalic and atraumatic. Right Ear: External ear normal.      Left Ear: External ear normal.   Pulmonary:      Effort: Pulmonary effort is normal.   Musculoskeletal: Normal range of motion. Skin:     General: Skin is warm and dry. Neurological:      Mental Status: He is alert and oriented to person, place, and time. Mental status is at baseline. Gait: Gait is intact. Psychiatric:         Mood and Affect: Affect normal.         Speech: Speech normal.         Thought Content: Thought content normal.         Judgment: Judgment normal.         ASSESSMENT/ PLAN   Diagnoses and all orders for this visit:    1. Current every day nicotine vaping  -     nicotine (NICODERM CQ) 14 mg/24 hr patch; 1 Patch by TransDERmal route every twenty-four (24) hours for 15 days.   -The patient was counseled on the dangers of tobacco use, and was advised to quit. Reviewed strategies to maximize success, including removing cigarettes and smoking materials from environment, stress management, substitution of other forms of reinforcement, support of family/friends and written materials. Smoking Cessation Referral Information:    [employer]uit [employer] [place]: 1-800-QUIT-NOW or 1-069-235-673-648-0976  "[employer].NET/[place]    2. Anxiety  -     clonazePAM (KlonoPIN) 0.25 mg TbDi; Take 1 tab as needed for anxiety attack. Max dose . 5mg daily. I was at home while conducting this encounter. Consent:  He and/or his healthcare decision maker is aware that this patient-initiated Telehealth encounter is a billable service, with coverage as determined by his insurance carrier. He is aware that he may receive a bill and has provided verbal consent to proceed: Yes    This virtual visit was conducted via [email or web address].   Pursuant to the emergency declaration under the [facility], [facility] waiver authority and the [employer] and [employer] Act, this Virtual  Visit was conducted to reduce the patient's risk of exposure to COVID-19 and provide continuity of care for an established patient. Services were provided through a video synchronous discussion virtually to substitute for in-person clinic visit. Due to this being a TeleHealth evaluation, many elements of the physical examination are unable to be assessed. Total Time: minutes: 11-20 minutes. Disclaimer:  Advised patient to call back or return to office if symptoms worsen/change/persist.  Discussed expected course/resolution/complications of diagnosis in detail with patient. Medication risks/benefits/alternatives discussed with patient. Patient was given an after visit summary which includes diagnoses, current medications, & vitals. Discussed patient instructions and advised to read to all patient instructions regarding care. Patient expressed understanding with the diagnosis and plan. This note will not be viewable in [address]         Laurie Stevenson NP  2/2/2021        (This document has been electronically signed)

## 2021-03-08 DIAGNOSIS — F41.9 ANXIETY: ICD-10-CM

## 2021-03-08 RX ORDER — ESCITALOPRAM OXALATE 20 MG/1
TABLET ORAL
Qty: 90 TAB | Refills: 1 | Status: SHIPPED | OUTPATIENT
Start: 2021-03-08 | End: 2021-05-18 | Stop reason: SDUPTHER

## 2021-03-09 ENCOUNTER — VIRTUAL VISIT (OUTPATIENT)
Dept: PRIMARY CARE CLINIC | Age: 31
End: 2021-03-09
Payer: MEDICAID

## 2021-03-09 DIAGNOSIS — Z91.09 ENVIRONMENTAL ALLERGIES: ICD-10-CM

## 2021-03-09 DIAGNOSIS — J02.9 SORE THROAT: ICD-10-CM

## 2021-03-09 DIAGNOSIS — J01.00 SUBACUTE MAXILLARY SINUSITIS: Primary | ICD-10-CM

## 2021-03-09 PROCEDURE — 99213 OFFICE O/P EST LOW 20 MIN: CPT | Performed by: INTERNAL MEDICINE

## 2021-03-09 RX ORDER — MOMETASONE FUROATE 50 UG/1
2 SPRAY, METERED NASAL DAILY
Qty: 1 CONTAINER | Refills: 0 | Status: SHIPPED | OUTPATIENT
Start: 2021-03-09 | End: 2021-04-01

## 2021-03-09 NOTE — PROGRESS NOTES
Pablo Engel (: 1990) is a 36 y.o. male, established patient, here for evaluation of the following chief complaint(s):   Sinus congestion      Written by Carola Angel, as dictated by Dr. Evelio Mcdowell MD.    ASSESSMENT/PLAN:  1. Subacute maxillary sinusitis  -     mometasone (NASONEX) 50 mcg/actuation nasal spray; 2 Sprays by Both Nostrils route daily for 10 days. , Normal, Disp-1 Container, R-0 sent to pharmacy. Potential side effects were discussed. I prescribed nasonex and instructed pt to use it for the next few days for congestion. Advised pt to use a saline nasal spray in conjunction with it to prevent drying of the nasal passages. -     amoxicillin-clavulanate (AUGMENTIN) 125-31.25 mg/5 mL suspension; Take 40 mL by mouth two (2) times a day for 10 days. , Normal, Disp-800 mL, R-0 sent to pharmacy. Potential side effects were discussed. I prescribed Amoxicillin. Pt has no history of diarrhea sfx from this medication. 2. Sore throat  -     amoxicillin-clavulanate (AUGMENTIN) 125-31.25 mg/5 mL suspension; Take 40 mL by mouth two (2) times a day for 10 days. , Normal, Disp-800 mL, R-0 sent to pharmacy. Potential side effects were discussed. I prescribed Amoxicillin. Pt has no history of diarrhea sfx from this medication. 3. Environmental allergies  Pt continues on antihistamines. I instructed him to use nasonex for the next few days for his congestion. SUBJECTIVE/OBJECTIVE:  HPI  Pt presents virtually today to discuss sinus pain, congestion, and sore throat. He has been noticing a yellow discharge coming from his nose. He has seasonal allergies for which he takes antihistamines, but he tends to get sinus infections twice a year. These are the sx he typically gets with sinus infections. He has seen NP Lucien Hoffmann for sinus infections in the past and has done well on amoxicillin for them.  He does not typically take steroids as they can increase his heart rate which makes him anxious. He has taken Nasonex in the past but has not had any in 2 years. He got his flu vaccine in 10/2020 from his employer. Current Outpatient Medications on File Prior to Visit   Medication Sig Dispense Refill    escitalopram oxalate (LEXAPRO) 20 mg tablet TAKE 1 TABLET BY MOUTH EVERY DAY  Indications: repeated episodes of anxiety 90 Tab 1    clonazePAM (KlonoPIN) 0.25 mg TbDi Take 1 tab as needed for anxiety attack. Max dose . 5mg daily. 15 Tab 0    emtricitabine-tenofovir, TDF, (TRUVADA) 200-300 mg per tablet Take 1 Tab by mouth daily. 90 Tab 0    benzocaine-zinc 5-0.1 % soln 5 ml to ear as needed for ear pain. 30 mL 0    propranoloL (INDERAL) 10 mg tablet Take 1 -2 tablet(s) by oral route 1 hour prior to public speaking. May repeat every 6 hours. 60 Tab 1    [DISCONTINUED] ciprofloxacin-dexamethasone (CIPRODEX) 0.3-0.1 % otic suspension 3-4 drops twice daily for one week. 7.5 mL 0     No current facility-administered medications on file prior to visit. No Known Allergies    No past medical history on file. Past Surgical History:   Procedure Laterality Date    HX TONSIL AND ADENOIDECTOMY Bilateral        No family history on file.     Social History     Socioeconomic History    Marital status: SINGLE     Spouse name: Not on file    Number of children: Not on file    Years of education: Not on file    Highest education level: Not on file   Occupational History    Not on file   Social Needs    Financial resource strain: Not on file    Food insecurity     Worry: Not on file     Inability: Not on file    Transportation needs     Medical: Not on file     Non-medical: Not on file   Tobacco Use    Smoking status: Former Smoker     Quit date: 2018     Years since quittin.6    Smokeless tobacco: Never Used   Substance and Sexual Activity    Alcohol use: Yes     Comment: twice a week    Drug use: Never    Sexual activity: Yes     Partners: Male     Birth control/protection: Condom   Lifestyle    Physical activity     Days per week: Not on file     Minutes per session: Not on file    Stress: Not on file   Relationships    Social connections     Talks on phone: Not on file     Gets together: Not on file     Attends [employer] service: Not on file     Active member of club or organization: Not on file     Attends meetings of clubs or organizations: Not on file     Relationship status: Not on file    Intimate partner violence     Fear of current or ex partner: Not on file     Emotionally abused: Not on file     Physically abused: Not on file     Forced sexual activity: Not on file   Other Topics Concern    Not on file   Social History Narrative    Not on file       Virtual Visit on 01/26/2021   Component Date Value Ref Range Status    WBC 01/26/2021 5.1  3.4 - 10.8 x10E3/uL Final    RBC 01/26/2021 5.06  4.14 - 5.80 x10E6/uL Final    HGB 01/26/2021 16.3  13.0 - 17.7 g/dL Final    HCT 01/26/2021 47.7  37.5 - 51.0 % Final    MCV 01/26/2021 94  79 - 97 fL Final    MCH 01/26/2021 32.2  26.6 - 33.0 pg Final    MCHC 01/26/2021 34.2  31.5 - 35.7 g/dL Final    RDW 01/26/2021 12.1  11.6 - 15.4 % Final    PLATELET 73/07/3294 503  150 - 450 x10E3/uL Final    NEUTROPHILS 01/26/2021 58  Not Estab. % Final    Lymphocytes 01/26/2021 31  Not Estab. % Final    MONOCYTES 01/26/2021 9  Not Estab. % Final    EOSINOPHILS 01/26/2021 1  Not Estab. % Final    BASOPHILS 01/26/2021 1  Not Estab. % Final    ABS. NEUTROPHILS 01/26/2021 2.9  1.4 - 7.0 x10E3/uL Final    Abs Lymphocytes 01/26/2021 1.6  0.7 - 3.1 x10E3/uL Final    ABS. MONOCYTES 01/26/2021 0.5  0.1 - 0.9 x10E3/uL Final    ABS. EOSINOPHILS 01/26/2021 0.0  0.0 - 0.4 x10E3/uL Final    ABS. BASOPHILS 01/26/2021 0.0  0.0 - 0.2 x10E3/uL Final    IMMATURE GRANULOCYTES 01/26/2021 0  Not Estab. % Final    ABS. IMM.  GRANS. 01/26/2021 0.0  0.0 - 0.1 x10E3/uL Final    Glucose 01/26/2021 106* 65 - 99 mg/dL Final    BUN 01/26/2021 11  6 - 20 mg/dL Final    Creatinine 01/26/2021 1.04  0.76 - 1.27 mg/dL Final    GFR est non-AA 01/26/2021 97  >59 mL/min/1.73 Final    GFR est AA 01/26/2021 112  >59 mL/min/1.73 Final    BUN/Creatinine ratio 01/26/2021 11  9 - 20 Final    Sodium 01/26/2021 143  134 - 144 mmol/L Final    Potassium 01/26/2021 4.3  3.5 - 5.2 mmol/L Final    Chloride 01/26/2021 102  96 - 106 mmol/L Final    CO2 01/26/2021 25  20 - 29 mmol/L Final    Calcium 01/26/2021 9.9  8.7 - 10.2 mg/dL Final    Protein, total 01/26/2021 7.3  6.0 - 8.5 g/dL Final    Albumin 01/26/2021 5.1  4.1 - 5.2 g/dL Final    GLOBULIN, TOTAL 01/26/2021 2.2  1.5 - 4.5 g/dL Final    A-G Ratio 01/26/2021 2.3* 1.2 - 2.2 Final    Bilirubin, total 01/26/2021 0.4  0.0 - 1.2 mg/dL Final    Alk. phosphatase 01/26/2021 84  39 - 117 IU/L Final    AST (SGOT) 01/26/2021 22  0 - 40 IU/L Final    ALT (SGPT) 01/26/2021 24  0 - 44 IU/L Final    Hepatitis A Ab, IgM 01/26/2021 Negative  Negative Final    Hep B surface Ag screen 01/26/2021 Negative  Negative Final    Hep B Core Ab, IgM 01/26/2021 Negative  Negative Final    Hep C Virus Ab 01/26/2021 <0.1  0.0 - 0.9 s/co ratio Final    Comment:                                   Negative:     < 0.8                               Indeterminate: 0.8 - 0.9                                    Positive:     > 0.9   The CDC recommends that a positive HCV antibody result   be followed up with a HCV Nucleic Acid Amplification   test (442342).  HIV SCREEN 4TH GENERATION WRFX 01/26/2021 Non Reactive  Non Reactive Final    TSH 01/26/2021 0.823  0.450 - 4.500 uIU/mL Final     Review of Systems   Constitutional: Negative for activity change, fatigue and unexpected weight change. HENT: Positive for congestion, sinus pain and sore throat. Negative for ear discharge, ear pain, hearing loss and rhinorrhea. Eyes: Negative for pain, discharge and redness.    Respiratory: Negative for cough, chest tightness and shortness of breath. Cardiovascular: Negative for leg swelling. Gastrointestinal: Negative for abdominal pain, constipation and diarrhea. Endocrine: Negative for polyuria. Genitourinary: Negative for dysuria, flank pain and urgency. Musculoskeletal: Negative for arthralgias, back pain and myalgias. Skin: Negative for color change. Allergic/Immunologic: Positive for environmental allergies. Neurological: Negative for dizziness, light-headedness and headaches. Psychiatric/Behavioral: Negative for dysphoric mood and sleep disturbance. The patient is not nervous/anxious.          Patient-Reported Vitals 3/9/2021   Patient-Reported Weight 195   Patient-Reported Height 60       Physical Exam    [INSTRUCTIONS:  \"[x]\" Indicates a positive item  \"[]\" Indicates a negative item  -- DELETE ALL ITEMS NOT EXAMINED]    Constitutional: [x] Appears well-developed and well-nourished [x] No apparent distress      [] Abnormal -     Mental status: [x] Alert and awake  [x] Oriented to person/place/time [x] Able to follow commands    [] Abnormal -     Eyes:   EOM    [x]  Normal    [] Abnormal -   Sclera  [x]  Normal    [] Abnormal -          Discharge [x]  None visible   [] Abnormal -     HENT: [x] Normocephalic, atraumatic  [] Abnormal -   [x] Mouth/Throat: Mucous membranes are moist    External Ears [x] Normal  [] Abnormal -    Neck: [x] No visualized mass [] Abnormal -     Pulmonary/Chest: [x] Respiratory effort normal   [x] No visualized signs of difficulty breathing or respiratory distress        [] Abnormal -      Musculoskeletal:   [x] Normal gait with no signs of ataxia         [x] Normal range of motion of neck        [] Abnormal -     Neurological:        [x] No Facial Asymmetry (Cranial nerve 7 motor function) (limited exam due to video visit)          [x] No gaze palsy        [] Abnormal -          Skin:        [x] No significant exanthematous lesions or discoloration noted on facial skin         [] Abnormal -            Psychiatric:       [x] Normal Affect [] Abnormal -        [x] No Hallucinations    Other pertinent observable physical exam findings:-    Karan Yap, was evaluated through a synchronous (real-time) audio-video encounter. The patient (or guardian if applicable) is aware that this is a billable service. Verbal consent to proceed has been obtained within the past 12 months. The visit was conducted pursuant to the emergency declaration under the [address] [place] [address] and the [employer] Act. Patient identification was verified, and a caregiver was present when appropriate. The patient was located in a state where the provider was credentialed to provide care. An electronic signature was used to authenticate this note.   -- Joaquin Romero

## 2021-03-11 DIAGNOSIS — J32.0 CHRONIC MAXILLARY SINUSITIS: Primary | ICD-10-CM

## 2021-03-12 ENCOUNTER — TELEPHONE (OUTPATIENT)
Dept: PRIMARY CARE CLINIC | Age: 31
End: 2021-03-12

## 2021-03-12 NOTE — TELEPHONE ENCOUNTER
Pharmacy cannot get the 125 and converted to 400 and gave verbal to convert and fill for the patient.

## 2021-03-12 NOTE — TELEPHONE ENCOUNTER
Pharmacy would like to maybe use higher strength of amoxicillin since such a large volume was ordered

## 2021-05-17 ENCOUNTER — PATIENT MESSAGE (OUTPATIENT)
Dept: PRIMARY CARE CLINIC | Age: 31
End: 2021-05-17

## 2021-05-17 DIAGNOSIS — F41.9 ANXIETY: ICD-10-CM

## 2021-05-18 RX ORDER — ESCITALOPRAM OXALATE 20 MG/1
TABLET ORAL
Qty: 90 TAB | Refills: 0 | Status: SHIPPED | OUTPATIENT
Start: 2021-05-18 | End: 2021-09-05

## 2021-05-18 NOTE — TELEPHONE ENCOUNTER
Requested Prescriptions     Pending Prescriptions Disp Refills    escitalopram oxalate (LEXAPRO) 20 mg tablet 90 Tab 0     Sig: TAKE 1 TABLET BY MOUTH EVERY DAY  Indications: repeated episodes of anxiety        Last Visit 3/9/21  Last Refill 3/8/21

## 2021-05-18 NOTE — TELEPHONE ENCOUNTER
From: Abimbola Dunn  To: Jacinta Alcaraz MD  Sent: 5/17/2021 9:51 AM EDT  Subject: Prescription Question    May I please get a three month refill of lexipro? I am transitioning to a new position and will be without insurance beginning July 6th.      Best,  Abimbola Dunn

## 2021-09-05 DIAGNOSIS — F41.9 ANXIETY: ICD-10-CM

## 2021-09-05 RX ORDER — ESCITALOPRAM OXALATE 20 MG/1
TABLET ORAL
Qty: 90 TABLET | Refills: 0 | Status: SHIPPED | OUTPATIENT
Start: 2021-09-05 | End: 2021-12-05

## 2022-03-13 DIAGNOSIS — F41.9 ANXIETY: Primary | ICD-10-CM

## 2022-03-13 RX ORDER — ESCITALOPRAM OXALATE 20 MG/1
20 TABLET ORAL DAILY
Qty: 90 TABLET | Refills: 0 | Status: SHIPPED | OUTPATIENT
Start: 2022-03-13 | End: 2022-06-11

## 2022-09-23 ENCOUNTER — OFFICE VISIT (OUTPATIENT)
Dept: FAMILY MEDICINE CLINIC | Age: 32
End: 2022-09-23
Payer: COMMERCIAL

## 2022-09-23 VITALS
WEIGHT: 215.4 LBS | HEIGHT: 72 IN | DIASTOLIC BLOOD PRESSURE: 94 MMHG | OXYGEN SATURATION: 93 % | TEMPERATURE: 98.1 F | RESPIRATION RATE: 16 BRPM | HEART RATE: 65 BPM | BODY MASS INDEX: 29.17 KG/M2 | SYSTOLIC BLOOD PRESSURE: 145 MMHG

## 2022-09-23 DIAGNOSIS — F41.9 ANXIETY: Primary | ICD-10-CM

## 2022-09-23 DIAGNOSIS — Z23 ENCOUNTER FOR IMMUNIZATION: ICD-10-CM

## 2022-09-23 DIAGNOSIS — R03.0 ELEVATED BP WITHOUT DIAGNOSIS OF HYPERTENSION: ICD-10-CM

## 2022-09-23 DIAGNOSIS — F40.248 FEAR OF PUBLIC SPEAKING: ICD-10-CM

## 2022-09-23 PROCEDURE — 90686 IIV4 VACC NO PRSV 0.5 ML IM: CPT

## 2022-09-23 PROCEDURE — 90471 IMMUNIZATION ADMIN: CPT

## 2022-09-23 PROCEDURE — 99203 OFFICE O/P NEW LOW 30 MIN: CPT | Performed by: PHYSICIAN ASSISTANT

## 2022-09-23 RX ORDER — PROPRANOLOL HYDROCHLORIDE 10 MG/1
TABLET ORAL
Qty: 30 TABLET | Refills: 0 | Status: SHIPPED | OUTPATIENT
Start: 2022-09-23

## 2022-09-23 RX ORDER — CLONAZEPAM 0.25 MG/1
TABLET, ORALLY DISINTEGRATING ORAL
Qty: 10 TABLET | Refills: 0 | Status: SHIPPED | OUTPATIENT
Start: 2022-09-23

## 2022-09-23 RX ORDER — ESCITALOPRAM OXALATE 20 MG/1
20 TABLET ORAL DAILY
Qty: 90 TABLET | Refills: 1 | Status: SHIPPED | OUTPATIENT
Start: 2022-09-23

## 2022-09-23 NOTE — PROGRESS NOTES
1. \"Have you been to the ER, urgent care clinic since your last visit? Hospitalized since your last visit? \" No    2. \"Have you seen or consulted any other health care providers outside of the [address] [place] [address] since your last visit? \" No     3. For patients aged 39-70: Has the patient had a colonoscopy / FIT/ Cologuard? NA - based on age      If the patient is female:    4. For patients aged 41-77: Has the patient had a mammogram within the past 2 years? NA - based on age or sex      11. For patients aged 21-65: Has the patient had a pap smear?  NA - based on age or sex

## 2022-09-23 NOTE — PROGRESS NOTES
Ioana Dyer is a 29 y.o. male who presents to the office today with the following:  Chief Complaint   Patient presents with    Establish Care     Wants to go over his medication and make sure the provider can take care of them and get refills       HPI  Just moved recently from [facility] to [facility] and is here to establish care. Pt states he has been treated for anxiety for the last 10 years. Symptoms have been well-controlled on Lexapro 20 mg daily. Also has clonazepam 0.25 mg on hand for prn purposes, which per pt he rarely takes. Says it mostly makes him feel better to have it \"Just in case\"  Reports 15 pills would last a year previously. He also takes propranolol prn for public speaking and works really well for him, as this is something he has to do for his job at times. Pt admits to having a lot of anxiety when coming in to the doctors and that is why he believes his BP to be a little up today. Denies any h/o HTN, but has not really checked at home. He denies any h/o heart dz. He denies any other PMH or psych sxs. Pt otherwise reports feeling well with no other complaints or acute concerns. He would like his flu shot. Review of Systems   Psychiatric/Behavioral:  Negative for depression, hallucinations, memory loss, substance abuse and suicidal ideas. The patient is nervous/anxious. The patient does not have insomnia. All other systems reviewed and are negative. See HPI. History reviewed. No pertinent past medical history. Past Surgical History:   Procedure Laterality Date    HX TONSIL AND ADENOIDECTOMY Bilateral        No Known Allergies    Current Outpatient Medications   Medication Sig    escitalopram oxalate (LEXAPRO) 20 mg tablet Take 1 Tablet by mouth daily. propranoloL (INDERAL) 10 mg tablet Take 1 -2 tablet(s) by oral route 1 hour prior to public speaking. May repeat every 6 hours. clonazePAM (KlonoPIN) 0.25 mg TbDi Take 1 tab as needed for anxiety attack.  Max dose .5mg daily. No current facility-administered medications for this visit. Social History     Socioeconomic History    Marital status: SINGLE   Tobacco Use    Smoking status: Former     Types: Cigarettes     Quit date: 2018     Years since quittin.1    Smokeless tobacco: Never   Substance and Sexual Activity    Alcohol use: Yes     Comment: twice a week    Drug use: Never    Sexual activity: Yes     Partners: Male     Birth control/protection: Condom       History reviewed. No pertinent family history. Physical Exam:  Visit Vitals  BP (!) 145/94 (BP 1 Location: Left upper arm, BP Patient Position: Sitting, BP Cuff Size: Adult)   Pulse 65   Temp 98.1 °F (36.7 °C) (Temporal)   Resp 16   Ht 6' (1.829 m)   Wt 215 lb 6.4 oz (97.7 kg)   SpO2 93%   BMI 29.21 kg/m²     Physical Exam  Vitals and nursing note reviewed. Constitutional:       Appearance: Normal appearance. HENT:      Head: Normocephalic and atraumatic. Neck:      Thyroid: No thyroid mass, thyromegaly or thyroid tenderness. Cardiovascular:      Rate and Rhythm: Normal rate and regular rhythm. Pulses: Normal pulses. Heart sounds: Normal heart sounds. Pulmonary:      Effort: Pulmonary effort is normal.      Breath sounds: Normal breath sounds. Musculoskeletal:         General: No swelling. Skin:     General: Skin is warm and dry. Neurological:      Mental Status: He is alert. Assessment/Plan:    ICD-10-CM ICD-9-CM    1. Anxiety  F41.9 300.00 escitalopram oxalate (LEXAPRO) 20 mg tablet      propranoloL (INDERAL) 10 mg tablet      clonazePAM (KlonoPIN) 0.25 mg TbDi      2. Fear of public speaking  G87.350 300.23 propranoloL (INDERAL) 10 mg tablet      3. Encounter for immunization  Z23 V03.89 INFLUENZA, FLUARIX, FLULAVAL, FLUZONE (AGE 6 MO+), AFLURIA(AGE 3Y+) IM, PF, 0.5 ML      4.  Elevated BP without diagnosis of hypertension  R03.0 796.2         Recommend pt monitor BP at home and rtc if consistently >140/90. Stable on current regimen. Caution regarding sedation and safety. Pt aware to continue Klonopin only prn, sparingly. I will not be prescribing for daily use.  reviewed and appropriate. Rx refilled. Follow-up and Dispositions    Return in about 6 months (around 3/23/2023), or sooner if symptoms worsen or fail to improve. Seek care in interim for any new sxs or other concerns. Pt verbalizes understanding and agrees with the plan.     Khurram Crump PA-C

## 2022-10-11 ENCOUNTER — HOSPITAL ENCOUNTER (EMERGENCY)
Age: 32
Discharge: HOME | End: 2022-10-11
Attending: EMERGENCY MEDICINE

## 2022-10-11 VITALS
HEART RATE: 64 BPM | TEMPERATURE: 98.6 F | HEIGHT: 72 IN | WEIGHT: 215 LBS | RESPIRATION RATE: 16 BRPM | SYSTOLIC BLOOD PRESSURE: 141 MMHG | OXYGEN SATURATION: 96 % | BODY MASS INDEX: 29.12 KG/M2 | DIASTOLIC BLOOD PRESSURE: 94 MMHG
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
## 2022-10-11 DIAGNOSIS — S09.90XA CLOSED HEAD INJURY, INITIAL ENCOUNTER: Primary | ICD-10-CM

## 2022-10-11 PROCEDURE — 99282 EMERGENCY DEPT VISIT SF MDM: CPT
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
## 2022-11-17 ENCOUNTER — DOCUMENTATION ONLY (OUTPATIENT)
Dept: FAMILY MEDICINE CLINIC | Age: 32
End: 2022-11-17

## 2022-11-17 ENCOUNTER — NURSE TRIAGE (OUTPATIENT)
Dept: OTHER | Facility: CLINIC | Age: 32
End: 2022-11-17

## 2022-11-17 NOTE — PROGRESS NOTES
No availability on 11/17/2022 for appt. Offered next available on 11/18/2022 and did not want that.   Patient decided to go to urgent care
No

## 2022-11-17 NOTE — TELEPHONE ENCOUNTER
Location of patient: [address] [facility]  call from [employer] at [facility] with [facility]. Subjective: Caller states \"having diarrhea and abd pain\" 2 negative covid tests    Current Symptoms: abd pain , fever, chills, diarrhea , congestion    Onset: 1 week ago; worsening    Associated Symptoms: reduced appetite, diarrhea    Pain Severity: 5/10; pressure; intermittent    Temperature: 99.8 yesterday orally    What has been tried: dayquil, nyquil    LMP: NA Pregnant: NA    Recommended disposition: See PCP within 24 Hours    Care advice provided, patient verbalizes understanding; denies any other questions or concerns; instructed to call back for any new or worsening symptoms. Patient/Caller agrees with recommended disposition; writer provided warm transfer to [employer] at [facility] for appointment scheduling    Attention Provider: Thank you for allowing me to participate in the care of your patient. The patient was connected to triage in response to information provided to the [facility]. Please do not respond through this encounter as the response is not directed to a shared pool.       Reason for Disposition   [1] MODERATE diarrhea (e.g., 4-6 times / day more than normal) AND [2] present > 48 hours (2 days)    Protocols used: Diarrhea-ADULT-AH

## 2022-11-18 ENCOUNTER — PATIENT MESSAGE (OUTPATIENT)
Dept: FAMILY MEDICINE CLINIC | Age: 32
End: 2022-11-18

## 2022-11-18 ENCOUNTER — VIRTUAL VISIT (OUTPATIENT)
Dept: FAMILY MEDICINE CLINIC | Age: 32
End: 2022-11-18
Payer: COMMERCIAL

## 2022-11-18 DIAGNOSIS — A09 ACUTE INFECTIOUS DIARRHEA: Primary | ICD-10-CM

## 2022-11-18 PROCEDURE — 99213 OFFICE O/P EST LOW 20 MIN: CPT | Performed by: FAMILY MEDICINE

## 2022-11-18 RX ORDER — DIPHENOXYLATE HYDROCHLORIDE AND ATROPINE SULFATE 2.5; .025 MG/1; MG/1
1 TABLET ORAL
Qty: 12 TABLET | Refills: 1 | Status: SHIPPED | OUTPATIENT
Start: 2022-11-18

## 2022-11-18 NOTE — PROGRESS NOTES
1. \"Have you been to the ER, urgent care clinic since your last visit? Hospitalized since your last visit? \" No    2. \"Have you seen or consulted any other health care providers outside of the [address] [place] [address] since your last visit? \" No     3. For patients aged 39-70: Has the patient had a colonoscopy / FIT/ Cologuard? NA - based on age      If the patient is female:    4. For patients aged 41-77: Has the patient had a mammogram within the past 2 years? NA - based on age or sex      11. For patients aged 21-65: Has the patient had a pap smear? NA - based on age or sex  Leydi Perdomo is a 29 y.o. male who was seen by synchronous (real-time) audio-video technology on 11/18/2022 for Diarrhea (X 1 week   -    2 home Negative COVID test), Fatigue, and Headache        Assessment & Plan:   Diagnoses and all orders for this visit:    1. Acute infectious diarrhea  -     diphenoxylate-atropine (LOMOTIL) 2.5-0.025 mg per tablet; Take 1 Tablet by mouth four (4) times daily as needed for Diarrhea. Max Daily Amount: 4 Tablets. Symptomatic rx. Present for in person evaluation if not improving 3-5 days. Push fluids    Subjective:     Traveled last weekend and returned with waterry diarrhea which has persisted. Drinking OK, appetite down. Some URI sxs in last two days. Negative COVID x 2. No fever, blood in stool or significant abdominal pain. No N/V. Prior to Admission medications    Medication Sig Start Date End Date Taking? Authorizing Provider   diphenoxylate-atropine (LOMOTIL) 2.5-0.025 mg per tablet Take 1 Tablet by mouth four (4) times daily as needed for Diarrhea. Max Daily Amount: 4 Tablets. 11/18/22  Yes Graig Fleischer, MD   escitalopram oxalate (LEXAPRO) 20 mg tablet Take 1 Tablet by mouth daily. 9/23/22  Yes Kar Fine PA-C   propranoloL (INDERAL) 10 mg tablet Take 1 -2 tablet(s) by oral route 1 hour prior to public speaking. May repeat every 6 hours.   Patient not taking: Reported on 11/18/2022 9/23/22   Marylene Salvage, PA-C   clonazePAM (KlonoPIN) 0.25 mg TbDi Take 1 tab as needed for anxiety attack. Max dose . 5mg daily. Patient not taking: Reported on 11/18/2022 9/23/22   Marylene Salvage, PA-C     There is no problem list on file for this patient. Current Outpatient Medications   Medication Sig Dispense Refill    diphenoxylate-atropine (LOMOTIL) 2.5-0.025 mg per tablet Take 1 Tablet by mouth four (4) times daily as needed for Diarrhea. Max Daily Amount: 4 Tablets. 12 Tablet 1    escitalopram oxalate (LEXAPRO) 20 mg tablet Take 1 Tablet by mouth daily. 90 Tablet 1    propranoloL (INDERAL) 10 mg tablet Take 1 -2 tablet(s) by oral route 1 hour prior to public speaking. May repeat every 6 hours. (Patient not taking: Reported on 11/18/2022) 30 Tablet 0    clonazePAM (KlonoPIN) 0.25 mg TbDi Take 1 tab as needed for anxiety attack. Max dose . 5mg daily.  (Patient not taking: Reported on 11/18/2022) 10 Tablet 0     No Known Allergies    ROS    Objective:     Patient-Reported Vitals 3/9/2021   Patient-Reported Weight 195   Patient-Reported Height 60        [INSTRUCTIONS:  \"[x]\" Indicates a positive item  \"[]\" Indicates a negative item  -- DELETE ALL ITEMS NOT EXAMINED]    Constitutional: [x] Appears well-developed and well-nourished [x] No apparent distress      [] Abnormal -     Mental status: [x] Alert and awake  [x] Oriented to person/place/time [x] Able to follow commands    [] Abnormal -     Eyes:   EOM    [x]  Normal    [] Abnormal -   Sclera  [x]  Normal    [] Abnormal -          Discharge [x]  None visible   [] Abnormal -     HENT: [x] Normocephalic, atraumatic  [] Abnormal -   [x] Mouth/Throat: Mucous membranes are moist    External Ears [x] Normal  [] Abnormal -    Neck: [x] No visualized mass [] Abnormal -     Pulmonary/Chest: [x] Respiratory effort normal   [x] No visualized signs of difficulty breathing or respiratory distress        [] Abnormal -      Musculoskeletal:   [x] Normal gait with no signs of ataxia         [x] Normal range of motion of neck        [] Abnormal -     Neurological:        [x] No Facial Asymmetry (Cranial nerve 7 motor function) (limited exam due to video visit)          [x] No gaze palsy        [] Abnormal -          Skin:        [x] No significant exanthematous lesions or discoloration noted on facial skin         [] Abnormal -            Psychiatric:       [x] Normal Affect [] Abnormal -        [x] No Hallucinations    Other pertinent observable physical exam findings:-        We discussed the expected course, resolution and complications of the diagnosis(es) in detail. Medication risks, benefits, costs, interactions, and alternatives were discussed as indicated. I advised him to contact the office if his condition worsens, changes or fails to improve as anticipated. He expressed understanding with the diagnosis(es) and plan. Madiha Caal, was evaluated through a synchronous (real-time) audio-video encounter. The patient (or guardian if applicable) is aware that this is a billable service, which includes applicable co-pays. This Virtual Visit was conducted with patient's (and/or legal guardian's) consent. The visit was conducted pursuant to the emergency declaration under the [address] [place] [address], [address] [place] [address] [facility] authority and the [employer] and [employer] General Act. Patient identification was verified, and a caregiver was present when appropriate. The patient was located at: Home: [address]  The provider was located at:  Facility (Appt Department): [id number] MD

## 2022-11-18 NOTE — LETTER
NOTIFICATION RETURN TO WORK / SCHOOL    11/18/2022 4:18 PM    Mr. Andrea Montemayor  [address]      To Whom It May Concern:    Andrea Montemayor is currently under the care of Charly N Saul Steven. He will return to work/school on: 11/21/2022    If there are questions or concerns please have the patient contact our office.         Sincerely,      Margie Bryant MD

## 2022-11-18 NOTE — TELEPHONE ENCOUNTER
From: Mike Rater  To: Trevin Hutson MD  Sent: 11/18/2022 7:54 AM EST  Subject: Today    I meant to ask for a work note, [employer] been out since Wednesday.      Respectfully,  Mavis Beck

## 2023-02-13 ENCOUNTER — VIRTUAL VISIT (OUTPATIENT)
Dept: FAMILY MEDICINE CLINIC | Age: 33
End: 2023-02-13
Payer: COMMERCIAL

## 2023-02-13 DIAGNOSIS — J06.9 URTI (ACUTE UPPER RESPIRATORY INFECTION): Primary | ICD-10-CM

## 2023-02-13 PROCEDURE — 99213 OFFICE O/P EST LOW 20 MIN: CPT | Performed by: FAMILY MEDICINE

## 2023-02-13 RX ORDER — MOMETASONE FUROATE 50 UG/1
2 SPRAY, METERED NASAL DAILY
Qty: 1 EACH | Refills: 3 | Status: SHIPPED | OUTPATIENT
Start: 2023-02-13

## 2023-02-13 NOTE — PROGRESS NOTES
Orion Saleh is a 29 y.o. male who was seen by synchronous (real-time) audio-video technology on 2/13/2023 for Cold Symptoms (Cough, x 1 week, neg covid, s throat, sinus pressure, no fever. )        Assessment & Plan:   Diagnoses and all orders for this visit:    1. URTI (acute upper respiratory infection)  -     mometasone (NASONEX) 50 mcg/actuation nasal spray; 2 Sprays by Both Nostrils route daily. May cont other OTC symptomatic treatment  Take Vit C 2000 U tid    RTC if not better or worse      Subjective:     Sore throat started 6 d ago, better today  Sinus pressure and affecting ears with loss of hearing occ  No facial pain or toothache  Colored discharge this am a little yellow  Cough, dry  No Fever   No N/V but occ diarrhea    Tested for Covid x3 but negative  Had Flu shot    No sick contact  Works in [place] and now some positive Covid case    Taking daily Claritin  About the same    Tried Niquil and Dayquil and helping    Not taking Nasonex      Prior to Admission medications    Medication Sig Start Date End Date Taking? Authorizing Provider   mometasone (NASONEX) 50 mcg/actuation nasal spray 2 Sprays by Both Nostrils route daily. 2/13/23  Yes Meryl Zendejas MD   escitalopram oxalate (LEXAPRO) 20 mg tablet Take 1 Tablet by mouth daily. 9/23/22  Yes Rick Jackman PA-C   diphenoxylate-atropine (LOMOTIL) 2.5-0.025 mg per tablet Take 1 Tablet by mouth four (4) times daily as needed for Diarrhea. Max Daily Amount: 4 Tablets. Patient not taking: Reported on 2/13/2023 11/18/22   Gina Rayo MD   propranoloL (INDERAL) 10 mg tablet Take 1 -2 tablet(s) by oral route 1 hour prior to public speaking. May repeat every 6 hours. Patient not taking: No sig reported 9/23/22   Rick Jackman PA-C   clonazePAM (KlonoPIN) 0.25 mg TbDi Take 1 tab as needed for anxiety attack. Max dose . 5mg daily.   Patient not taking: No sig reported 9/23/22   Rick Jackman PA-C     Current Outpatient Medications Medication Sig Dispense Refill    mometasone (NASONEX) 50 mcg/actuation nasal spray 2 Sprays by Both Nostrils route daily. 1 Each 3    escitalopram oxalate (LEXAPRO) 20 mg tablet Take 1 Tablet by mouth daily. 90 Tablet 1    diphenoxylate-atropine (LOMOTIL) 2.5-0.025 mg per tablet Take 1 Tablet by mouth four (4) times daily as needed for Diarrhea. Max Daily Amount: 4 Tablets. (Patient not taking: Reported on 2/13/2023) 12 Tablet 1    propranoloL (INDERAL) 10 mg tablet Take 1 -2 tablet(s) by oral route 1 hour prior to public speaking. May repeat every 6 hours. (Patient not taking: No sig reported) 30 Tablet 0    clonazePAM (KlonoPIN) 0.25 mg TbDi Take 1 tab as needed for anxiety attack. Max dose . 5mg daily.  (Patient not taking: No sig reported) 10 Tablet 0     No Known Allergies  Past Medical History:   Diagnosis Date    Anxiety        ROS    Objective:     Patient-Reported Vitals 3/9/2021   Patient-Reported Weight 195   Patient-Reported Height 60        [INSTRUCTIONS:  \"[x]\" Indicates a positive item  \"[]\" Indicates a negative item  -- DELETE ALL ITEMS NOT EXAMINED]    Constitutional: [x] Appears well-developed and well-nourished [x] No apparent distress      [] Abnormal -     Mental status: [x] Alert and awake  [x] Oriented to person/place/time [x] Able to follow commands    [] Abnormal -     Eyes:   EOM    [x]  Normal    [] Abnormal -   Sclera  [x]  Normal    [] Abnormal -          Discharge [x]  None visible   [] Abnormal -     HENT: [x] Normocephalic, atraumatic  [] Abnormal -   [x] Mouth/Throat: Mucous membranes are moist    External Ears [x] Normal  [] Abnormal -    Neck: [x] No visualized mass [] Abnormal -     Pulmonary/Chest: [x] Respiratory effort normal   [x] No visualized signs of difficulty breathing or respiratory distress        [] Abnormal -      Musculoskeletal:   [x] Normal gait with no signs of ataxia         [x] Normal range of motion of neck        [] Abnormal -     Neurological:        [x] No Facial Asymmetry (Cranial nerve 7 motor function) (limited exam due to video visit)          [x] No gaze palsy        [] Abnormal -          Skin:        [x] No significant exanthematous lesions or discoloration noted on facial skin         [] Abnormal -            Psychiatric:       [x] Normal Affect [] Abnormal -        [x] No Hallucinations    Other pertinent observable physical exam findings:-        We discussed the expected course, resolution and complications of the diagnosis(es) in detail. Medication risks, benefits, costs, interactions, and alternatives were discussed as indicated. I advised him to contact the office if his condition worsens, changes or fails to improve as anticipated. He expressed understanding with the diagnosis(es) and plan. Paul Alfonso, was evaluated through a synchronous (real-time) audio-video encounter. The patient (or guardian if applicable) is aware that this is a billable service, which includes applicable co-pays. This Virtual Visit was conducted with patient's (and/or legal guardian's) consent. The visit was conducted pursuant to the emergency declaration under the [address] [place] [address] [facility] authority and the [employer] and [employer] General Act. Patient identification was verified, and a caregiver was present when appropriate. The patient was located at: Home: [id number] [place] [address]. [address]  The provider was located at:  Facility (Appt Department): [facility] [phone]        Tony Keen MD

## 2023-03-31 ENCOUNTER — HOSPITAL ENCOUNTER (EMERGENCY)
Age: 33
Discharge: HOME | End: 2023-03-31
Attending: EMERGENCY MEDICINE
Payer: COMMERCIAL

## 2023-03-31 VITALS
TEMPERATURE: 97.9 F | HEART RATE: 73 BPM | WEIGHT: 215 LBS | OXYGEN SATURATION: 100 % | BODY MASS INDEX: 29.12 KG/M2 | RESPIRATION RATE: 14 BRPM | HEIGHT: 72 IN | DIASTOLIC BLOOD PRESSURE: 93 MMHG | SYSTOLIC BLOOD PRESSURE: 133 MMHG

## 2023-03-31 DIAGNOSIS — S61.211A LACERATION OF LEFT INDEX FINGER WITHOUT FOREIGN BODY WITHOUT DAMAGE TO NAIL, INITIAL ENCOUNTER: Primary | ICD-10-CM

## 2023-03-31 PROCEDURE — 75810000293 HC SIMP/SUPERF WND  RPR

## 2023-03-31 PROCEDURE — 99284 EMERGENCY DEPT VISIT MOD MDM: CPT

## 2023-03-31 PROCEDURE — 90471 IMMUNIZATION ADMIN: CPT

## 2023-03-31 PROCEDURE — 90715 TDAP VACCINE 7 YRS/> IM: CPT | Performed by: EMERGENCY MEDICINE

## 2023-03-31 PROCEDURE — 74011250636 HC RX REV CODE- 250/636: Performed by: EMERGENCY MEDICINE

## 2023-03-31 PROCEDURE — 74011000250 HC RX REV CODE- 250: Performed by: EMERGENCY MEDICINE

## 2023-03-31 RX ADMIN — Medication 5 ML: at 11:06

## 2023-03-31 RX ADMIN — TETANUS TOXOID, REDUCED DIPHTHERIA TOXOID AND ACELLULAR PERTUSSIS VACCINE, ADSORBED 0.5 ML: 5; 2.5; 8; 8; 2.5 SUSPENSION INTRAMUSCULAR at 11:02

## 2023-03-31 NOTE — ED PROVIDER NOTES
[facility] EMERGENCY DEP  EMERGENCY DEPARTMENT ENCOUNTER       Pt Name: Felice Foster  MRN: [id number]  [place] 1990  Date of evaluation: 3/31/2023  Provider: Lilliam Resendez MD   PCP: Mario Sutton PA-C  Note Started: 11:01 AM 3/31/23     CHIEF COMPLAINT       Chief Complaint   Patient presents with    Laceration        HISTORY OF PRESENT ILLNESS: 1 or more elements      History From: patient, History limited by: none     Felice Foster is a 34 y.o. male who presents with a chief complaint of a laceration to the left pointer finger       Please See MDM for Additional Details of the HPI/PMH  Nursing Notes were all reviewed and agreed with or any disagreements were addressed in the HPI. REVIEW OF SYSTEMS        Positives and Pertinent negatives as per HPI. PAST HISTORY     Past Medical History:  Past Medical History:   Diagnosis Date    Anxiety        Past Surgical History:  Past Surgical History:   Procedure Laterality Date    HX TONSIL AND ADENOIDECTOMY Bilateral        Family History:  History reviewed. No pertinent family history. Social History:  Social History     Tobacco Use    Smoking status: Former     Types: Cigarettes     Quit date: 2018     Years since quittin.7    Smokeless tobacco: Never   Vaping Use    Vaping Use: Never used   Substance Use Topics    Alcohol use: Yes     Comment: twice a week    Drug use: Never       Allergies:  No Known Allergies    CURRENT MEDICATIONS      Discharge Medication List as of 3/31/2023 11:41 AM        CONTINUE these medications which have NOT CHANGED    Details   propranoloL (INDERAL) 10 mg tablet TAKE 1 TO 2 TABLETS BY MOUTH 1 HOUR PRIOR TO PUBLIC SPEAKING. MAY REPEAT EVERY 6 HOURS, Normal, Disp-30 Tablet, R-0      mometasone (NASONEX) 50 mcg/actuation nasal spray 2 Sprays by Both Nostrils route daily. , Normal, Disp-1 Each, R-3      diphenoxylate-atropine (LOMOTIL) 2.5-0.025 mg per tablet Take 1 Tablet by mouth four (4) times daily as [facility]  [address]  Phone [phone]  Fax [phone]    Subjective:    Zacarias Lucio is a 15 y.o. female who presents to clinic with her mother, brother     Here for sharp abdominal pain. 2-3 times a week. Fleeting in nature. Not associated with periods. Better when she balls up. Periods regular. No constipation issues. Since Dec. No Precipitators      The medications were reviewed and updated in the medical record. The past medical history, past surgical history, and family history were reviewed and updated in the medical record. ROS: Review of Symptoms: History obtained from mother and the patient. General ROS: negative    Visit Vitals    Pulse 81    Temp 96.3 °F (35.7 °C) (Oral)    Resp 12    Ht 4' 10.5\" (1.486 m)    Wt 104 lb 6.4 oz (47.4 kg)    LMP 03/08/2017    BMI 21.45 kg/m2       PE:  General  no distress, well developed, well nourished  HEENT  normocephalic/ atraumatic, tympanic membrane's clear bilaterally, oropharynx clear and moist mucous membranes  Respiratory  Clear Breath Sounds Bilaterally, No Increased Effort and Good Air Movement Bilaterally  Cardiovascular   RRR, S1S2 and No murmur  Abdomen  soft, non tender, non distended, bowel sounds present in all 4 quadrants, no hepato-splenomegaly, no masses and LLQ  Skin  No Rash    ASSESSMENT       ICD-10-CM ICD-9-CM    1. Left lower quadrant pain R10.32 789.04 CBC WITH AUTOMATED DIFF      SED RATE (ESR)      AMB POC URINE PREGNANCY TEST, VISUAL COLOR COMPARISON      METABOLIC PANEL, COMPREHENSIVE      XR ABD (KUB)   2.  Urine abnormality R82.90 791.9 AMB POC URINALYSIS DIP STICK MANUAL W/O MICRO      CULTURE, URINE     Results for orders placed or performed in visit on 03/08/17   AMB POC URINALYSIS DIP STICK MANUAL W/O MICRO   Result Value Ref Range    Color (UA POC) Yellow Yellow    Clarity (UA POC) Cloudy Clear    Glucose (UA POC) Negative Negative    Bilirubin (UA POC) Negative Negative    Ketones (UA POC) 3+ Negative    Specific gravity (UA POC) 1.020 1.001 - 1.035    Blood (UA POC) 4+ Negative    pH (UA POC) 6.5 4.6 - 8.0    Protein (UA POC) Trace Negative mg/dL    Urobilinogen (UA POC) normal 0.2 - 1    Nitrites (UA POC) Negative Negative    Leukocyte esterase (UA POC) Negative Negative   AMB POC URINE PREGNANCY TEST, VISUAL COLOR COMPARISON   Result Value Ref Range    VALID INTERNAL CONTROL POC Yes     HCG urine, Ql. (POC) Negative Negative     Orders Placed This Encounter    CULTURE, URINE    XR ABD (KUB)     Standing Status:   Future     Standing Expiration Date:   4/8/2018     Order Specific Question:   Reason for Exam     Answer:   LLQ abd pain     Order Specific Question:   Is Patient Allergic to Contrast Dye? Answer:   Unknown     Order Specific Question:   Is Patient Pregnant? Answer:   No    CBC WITH AUTOMATED DIFF    SED RATE (ESR)    METABOLIC PANEL, COMPREHENSIVE    AMB POC URINALYSIS DIP STICK MANUAL W/O MICRO    AMB POC URINE PREGNANCY TEST, VISUAL COLOR COMPARISON       PLAN    Orders Placed This Encounter    CULTURE, URINE    XR ABD (KUB)    CBC WITH AUTOMATED DIFF    SED RATE (ESR)    METABOLIC PANEL, COMPREHENSIVE    AMB POC URINALYSIS DIP STICK MANUAL W/O MICRO    AMB POC URINE PREGNANCY TEST, VISUAL COLOR COMPARISON       Written instructions were provided for Abdominal pain      Follow-up Disposition:  Return if symptoms worsen or fail to improve.       Betty Abbott MD, FAAP  (This document has been electronically signed) needed for Diarrhea. Max Daily Amount: 4 Tablets., Normal, Disp-12 Tablet, R-1      escitalopram oxalate (LEXAPRO) 20 mg tablet Take 1 Tablet by mouth daily. , Normal, Disp-90 Tablet, R-1Needs an appointment before next refill. clonazePAM (KlonoPIN) 0.25 mg TbDi Take 1 tab as needed for anxiety attack. Max dose . 5mg daily. , Normal, Disp-10 Tablet, R-0             SCREENINGS               No data recorded         PHYSICAL EXAM      ED Triage Vitals   ED Encounter Vitals Group      BP 03/31/23 1046 (!) 133/93      Pulse (Heart Rate) 03/31/23 1046 73      Resp Rate 03/31/23 1046 14      Temp 03/31/23 1048 97.9 °F (36.6 °C)      Temp src --       O2 Sat (%) 03/31/23 1046 100 %      Weight 03/31/23 1046 215 lb      Height 03/31/23 1046 6'        Physical Exam  Vitals and nursing note reviewed. Constitutional:       General: He is not in acute distress. Appearance: He is well-developed. HENT:      Head: Normocephalic and atraumatic. Eyes:      Conjunctiva/sclera: Conjunctivae normal.      Pupils: Pupils are equal, round, and reactive to light. Cardiovascular:      Rate and Rhythm: Normal rate. Pulmonary:      Effort: Pulmonary effort is normal. No respiratory distress. Abdominal:      General: There is no distension. Musculoskeletal:         General: Normal range of motion. Cervical back: Normal range of motion. Skin:     General: Skin is warm and dry. Findings: Laceration (1cm laceration to the distal left pointer finger, bleeding controlled) present. Neurological:      Mental Status: He is alert and oriented to person, place, and time. Psychiatric:         Mood and Affect: Mood normal.        DIAGNOSTIC RESULTS   LABS:     No results found for this or any previous visit (from the past 12 hour(s)). EKG:  If performed, independent interpretation documented below in the MDM section     RADIOLOGY:  Non-plain film images such as CT, Ultrasound and MRI are read by the radiologist. Plain radiographic images are visualized and preliminarily interpreted by the ED Provider with the findings documented in the MDM section. Interpretation per the Radiologist below, if available at the time of this note:     No results found. PROCEDURES   Unless otherwise noted below, none  Wound Repair    Date/Time: 3/31/2023 11:39 AM  Performed by: attendingPreparation: sterile field established  Location details: left index finger  Wound length:2.5 cm or less    Anesthesia:  Local Anesthetic: LET (lido, epi, tetracaine)  Foreign bodies: no foreign bodies  Irrigation solution: tap water  Irrigation method: syringe  Debridement: none  Skin closure: 5-0 nylon  Number of sutures: 2  Technique: simple and interrupted  Approximation: close  Patient tolerance: patient tolerated the procedure well with no immediate complications  My total time at bedside, performing this procedure was 1-15 minutes. CRITICAL CARE TIME   0    EMERGENCY DEPARTMENT COURSE and DIFFERENTIAL DIAGNOSIS/MDM   Vitals:    Vitals:    03/31/23 1046 03/31/23 1048   BP: (!) 133/93    Pulse: 73    Resp: 14    Temp:  97.9 °F (36.6 °C)   SpO2: 100%    Weight: 97.5 kg (215 lb)    Height: 6' (1.829 m)         Patient was given the following medications:  Medications   lidocaine/EPINEPHrine/tetracaine (L.E.T) topical gel (5 mL Topical Given 3/31/23 1106)   diph,Pertuss(AC),Tet Vac-PF (BOOSTRIX) suspension 0.5 mL (0.5 mL IntraMUSCular Given 3/31/23 1102)       Medical Decision Making  57-year-old male who presents with a chief complaint of laceration to the left pointer finger. Occurred approximately 30 minutes prior to arrival.  He tells me he was cutting something with scissors and accidentally cut his finger. He is right-hand dominant. Unsure of his last tetanus shot. No other associated injuries. On exam he has an approximately 1 cm laceration to the distal left pointer finger with controlled bleeding. Will require laceration repair. Will update tetanus shot. Risk  Prescription drug management. FINAL IMPRESSION     1. Laceration of left index finger without foreign body without damage to nail, initial encounter          DISPOSITION/PLAN   Tomas Nieto's  results have been reviewed with him. He has been counseled regarding his diagnosis, treatment, and plan. He verbally conveys understanding and agreement of the signs, symptoms, diagnosis, treatment and prognosis and additionally agrees to follow up as discussed. He also agrees with the care-plan and conveys that all of his questions have been answered. I have also provided discharge instructions for him that include: educational information regarding their diagnosis and treatment, and list of reasons why they would want to return to the ED prior to their follow-up appointment, should his condition change. CLINICAL IMPRESSION    Discharge Note: The patient is stable for discharge home. The signs, symptoms, diagnosis, and discharge instructions have been discussed, understanding conveyed, and agreed upon. The patient is to follow up as recommended or return to ER should their symptoms worsen. PATIENT REFERRED TO:  Follow-up Information       Follow up With Specialties Details Why Contact Info    Mae Dobbins PA-C Physician Assistant In 1 week 7-10 days for suture removal [address] [place] [address]  [phone]                DISCHARGE MEDICATIONS:  Discharge Medication List as of 3/31/2023 11:41 AM            DISCONTINUED MEDICATIONS:  Discharge Medication List as of 3/31/2023 11:41 AM          I am the Primary Clinician of Record. Nain Easley MD (electronically signed)    (Please note that parts of this dictation were completed with voice recognition software. Quite often unanticipated grammatical, syntax, homophones, and other interpretive errors are inadvertently transcribed by the computer software.  Please disregards these errors.  Please excuse any errors that have escaped final proofreading.)

## 2023-04-04 RX ORDER — ESCITALOPRAM OXALATE 20 MG/1
TABLET ORAL
Qty: 30 TABLET | Refills: 0 | Status: SHIPPED
Start: 2023-04-04

## 2023-05-20 RX ORDER — PROPRANOLOL HYDROCHLORIDE 10 MG/1
TABLET ORAL
COMMUNITY
Start: 2023-04-12

## 2023-05-20 RX ORDER — CLONAZEPAM 0.25 MG/1
TABLET, ORALLY DISINTEGRATING ORAL
COMMUNITY
Start: 2023-04-12

## 2023-05-20 RX ORDER — MOMETASONE FUROATE 50 UG/1
2 SPRAY, METERED NASAL DAILY
COMMUNITY
Start: 2023-04-12

## 2023-05-20 RX ORDER — ESCITALOPRAM OXALATE 20 MG/1
20 TABLET ORAL DAILY
COMMUNITY
Start: 2023-04-12

## 2023-07-06 ENCOUNTER — PATIENT MESSAGE (OUTPATIENT)
Dept: FAMILY MEDICINE CLINIC | Age: 33
End: 2023-07-06

## 2023-07-06 DIAGNOSIS — F17.210 CIGARETTE NICOTINE DEPENDENCE WITHOUT COMPLICATION: Primary | ICD-10-CM

## 2023-07-11 NOTE — TELEPHONE ENCOUNTER
From: Xavier Herrera  To: Clover Bloch  Sent: 7/6/2023 2:59 PM EDT  Subject: 7 mg nicotine     Can you please [facility] a [facility] the 7 mg nicotine patches.

## 2023-10-18 ENCOUNTER — OFFICE VISIT (OUTPATIENT)
Dept: FAMILY MEDICINE CLINIC | Age: 33
End: 2023-10-18
Payer: COMMERCIAL

## 2023-10-18 VITALS
RESPIRATION RATE: 18 BRPM | WEIGHT: 223 LBS | HEART RATE: 74 BPM | BODY MASS INDEX: 30.2 KG/M2 | SYSTOLIC BLOOD PRESSURE: 130 MMHG | DIASTOLIC BLOOD PRESSURE: 70 MMHG | OXYGEN SATURATION: 96 % | HEIGHT: 72 IN

## 2023-10-18 DIAGNOSIS — I10 STAGE 1 HYPERTENSION: ICD-10-CM

## 2023-10-18 DIAGNOSIS — G47.19 EXCESSIVE DAYTIME SLEEPINESS: ICD-10-CM

## 2023-10-18 DIAGNOSIS — Z23 ENCOUNTER FOR IMMUNIZATION: ICD-10-CM

## 2023-10-18 DIAGNOSIS — F41.9 ANXIETY DISORDER, UNSPECIFIED TYPE: Primary | ICD-10-CM

## 2023-10-18 PROCEDURE — 3078F DIAST BP <80 MM HG: CPT | Performed by: PHYSICIAN ASSISTANT

## 2023-10-18 PROCEDURE — 90471 IMMUNIZATION ADMIN: CPT | Performed by: PHYSICIAN ASSISTANT

## 2023-10-18 PROCEDURE — 99214 OFFICE O/P EST MOD 30 MIN: CPT | Performed by: PHYSICIAN ASSISTANT

## 2023-10-18 PROCEDURE — G8482 FLU IMMUNIZE ORDER/ADMIN: HCPCS | Performed by: PHYSICIAN ASSISTANT

## 2023-10-18 PROCEDURE — G8417 CALC BMI ABV UP PARAM F/U: HCPCS | Performed by: PHYSICIAN ASSISTANT

## 2023-10-18 PROCEDURE — G8427 DOCREV CUR MEDS BY ELIG CLIN: HCPCS | Performed by: PHYSICIAN ASSISTANT

## 2023-10-18 PROCEDURE — 3074F SYST BP LT 130 MM HG: CPT | Performed by: PHYSICIAN ASSISTANT

## 2023-10-18 PROCEDURE — 1036F TOBACCO NON-USER: CPT | Performed by: PHYSICIAN ASSISTANT

## 2023-10-18 PROCEDURE — 90674 CCIIV4 VAC NO PRSV 0.5 ML IM: CPT | Performed by: PHYSICIAN ASSISTANT

## 2023-10-18 RX ORDER — ESCITALOPRAM OXALATE 20 MG/1
20 TABLET ORAL DAILY
Qty: 90 TABLET | Refills: 1 | Status: SHIPPED | OUTPATIENT
Start: 2023-10-18

## 2023-10-18 ASSESSMENT — COLUMBIA-SUICIDE SEVERITY RATING SCALE - C-SSRS
3. HAVE YOU BEEN THINKING ABOUT HOW YOU MIGHT KILL YOURSELF?: NO
7. DID THIS OCCUR IN THE LAST THREE MONTHS: NO
5. HAVE YOU STARTED TO WORK OUT OR WORKED OUT THE DETAILS OF HOW TO KILL YOURSELF? DO YOU INTEND TO CARRY OUT THIS PLAN?: NO
4. HAVE YOU HAD THESE THOUGHTS AND HAD SOME INTENTION OF ACTING ON THEM?: NO

## 2023-10-18 ASSESSMENT — PATIENT HEALTH QUESTIONNAIRE - PHQ9
SUM OF ALL RESPONSES TO PHQ QUESTIONS 1-9: 0
6. FEELING BAD ABOUT YOURSELF - OR THAT YOU ARE A FAILURE OR HAVE LET YOURSELF OR YOUR FAMILY DOWN: 0
8. MOVING OR SPEAKING SO SLOWLY THAT OTHER PEOPLE COULD HAVE NOTICED. OR THE OPPOSITE, BEING SO FIGETY OR RESTLESS THAT YOU HAVE BEEN MOVING AROUND A LOT MORE THAN USUAL: 0
1. LITTLE INTEREST OR PLEASURE IN DOING THINGS: 0
9. THOUGHTS THAT YOU WOULD BE BETTER OFF DEAD, OR OF HURTING YOURSELF: 0
SUM OF ALL RESPONSES TO PHQ QUESTIONS 1-9: 0
5. POOR APPETITE OR OVEREATING: 0
SUM OF ALL RESPONSES TO PHQ9 QUESTIONS 1 & 2: 0
4. FEELING TIRED OR HAVING LITTLE ENERGY: 0
2. FEELING DOWN, DEPRESSED OR HOPELESS: 0
SUM OF ALL RESPONSES TO PHQ QUESTIONS 1-9: 0
10. IF YOU CHECKED OFF ANY PROBLEMS, HOW DIFFICULT HAVE THESE PROBLEMS MADE IT FOR YOU TO DO YOUR WORK, TAKE CARE OF THINGS AT HOME, OR GET ALONG WITH OTHER PEOPLE: 0
SUM OF ALL RESPONSES TO PHQ QUESTIONS 1-9: 0
3. TROUBLE FALLING OR STAYING ASLEEP: 0
7. TROUBLE CONCENTRATING ON THINGS, SUCH AS READING THE NEWSPAPER OR WATCHING TELEVISION: 0

## 2023-10-18 ASSESSMENT — ENCOUNTER SYMPTOMS: SHORTNESS OF BREATH: 0

## 2023-10-18 NOTE — PROGRESS NOTES
Patient was administered Flu shot in left deltoid via IM. Patient tolerated Flu shot well. Medication information reviewed with patient, patient states understanding. Patient to resume routine medications at home. Patient given copy of AVS and VIIS with medication information and instructions for home. VIIS reviewed with patient and patient states understanding.
Housing in the Last Year: No     Unstable Housing in the Last Year: No       History reviewed. No pertinent family history. Physical Exam:  Vitals:    10/18/23 1604   BP: 130/70   Site: Right Upper Arm   Position: Sitting   Cuff Size: Large Adult   Pulse: 74   Resp: 18   SpO2: 96%   Weight: 101.2 kg (223 lb)   Height: 1.829 m (6' 0.01\")     Physical Exam  Vitals and nursing note reviewed. Constitutional:       Appearance: Normal appearance. HENT:      Head: Normocephalic and atraumatic. Mouth/Throat:      Comments: Mallampati- 2  Cardiovascular:      Rate and Rhythm: Normal rate and regular rhythm. Pulses: Normal pulses. Heart sounds: Normal heart sounds. Pulmonary:      Effort: Pulmonary effort is normal.      Breath sounds: Normal breath sounds. Musculoskeletal:         General: No swelling. Skin:     General: Skin is warm and dry. Neurological:      General: No focal deficit present. Mental Status: He is alert and oriented to person, place, and time. Psychiatric:         Mood and Affect: Mood normal.         Assessment/Plan:   Diagnosis Orders   1. Anxiety disorder, unspecified type  escitalopram (LEXAPRO) 20 MG tablet  Stable. Will continue, but if sleep study unremarkable and pt continues to have EDS may want to consider tapering off to r/o as long-term med SE. Caution with safety sensitive activities/driving in interim. 2. Excessive daytime sleepiness  [facility]      3. Stage 1 hypertension  Pt would really like to work on LM first. Discussed in detail and tx options should he have a difficult time bringing down on his own. 4. Encounter for immunization  Influenza, FLUCELVAX, (age 10 mo+), IM, PF, 0.5 mL        Return in about 3 months (around 1/18/2024), or sooner if symptoms worsen or fail to improve. Seek care in interim for any new sxs or other concerns. Pt verbalizes understanding and agrees with the plan.     Harman Ingram.

## 2023-12-04 ENCOUNTER — OFFICE VISIT (OUTPATIENT)
Dept: FAMILY MEDICINE CLINIC | Age: 33
End: 2023-12-04
Payer: COMMERCIAL

## 2023-12-04 VITALS
RESPIRATION RATE: 16 BRPM | OXYGEN SATURATION: 97 % | HEIGHT: 72 IN | SYSTOLIC BLOOD PRESSURE: 121 MMHG | BODY MASS INDEX: 28.25 KG/M2 | WEIGHT: 208.6 LBS | HEART RATE: 91 BPM | TEMPERATURE: 99.1 F | DIASTOLIC BLOOD PRESSURE: 82 MMHG

## 2023-12-04 DIAGNOSIS — J02.9 ACUTE PHARYNGITIS, UNSPECIFIED ETIOLOGY: ICD-10-CM

## 2023-12-04 DIAGNOSIS — R68.89 FLU-LIKE SYMPTOMS: Primary | ICD-10-CM

## 2023-12-04 LAB
INFLUENZA A ANTIGEN, POC: NEGATIVE
INFLUENZA B ANTIGEN, POC: NEGATIVE
STREP PYOGENES DNA, POC: NEGATIVE
VALID INTERNAL CONTROL, POC: YES
VALID INTERNAL CONTROL, POC: YES

## 2023-12-04 PROCEDURE — 3079F DIAST BP 80-89 MM HG: CPT | Performed by: FAMILY MEDICINE

## 2023-12-04 PROCEDURE — G8482 FLU IMMUNIZE ORDER/ADMIN: HCPCS | Performed by: FAMILY MEDICINE

## 2023-12-04 PROCEDURE — 87651 STREP A DNA AMP PROBE: CPT | Performed by: FAMILY MEDICINE

## 2023-12-04 PROCEDURE — 3074F SYST BP LT 130 MM HG: CPT | Performed by: FAMILY MEDICINE

## 2023-12-04 PROCEDURE — G8428 CUR MEDS NOT DOCUMENT: HCPCS | Performed by: FAMILY MEDICINE

## 2023-12-04 PROCEDURE — 1036F TOBACCO NON-USER: CPT | Performed by: FAMILY MEDICINE

## 2023-12-04 PROCEDURE — 99213 OFFICE O/P EST LOW 20 MIN: CPT | Performed by: FAMILY MEDICINE

## 2023-12-04 PROCEDURE — G8417 CALC BMI ABV UP PARAM F/U: HCPCS | Performed by: FAMILY MEDICINE

## 2023-12-04 PROCEDURE — 87502 INFLUENZA DNA AMP PROBE: CPT | Performed by: FAMILY MEDICINE

## 2023-12-04 ASSESSMENT — PATIENT HEALTH QUESTIONNAIRE - PHQ9
SUM OF ALL RESPONSES TO PHQ9 QUESTIONS 1 & 2: 0
SUM OF ALL RESPONSES TO PHQ QUESTIONS 1-9: 0
2. FEELING DOWN, DEPRESSED OR HOPELESS: 0
SUM OF ALL RESPONSES TO PHQ QUESTIONS 1-9: 0
1. LITTLE INTEREST OR PLEASURE IN DOING THINGS: 0

## 2023-12-07 ENCOUNTER — TELEPHONE (OUTPATIENT)
Dept: FAMILY MEDICINE CLINIC | Age: 33
End: 2023-12-07

## 2023-12-07 NOTE — TELEPHONE ENCOUNTER
Pt calling to report that his throat is hurting much worse. And he is not getting any better. He would like to get another strep test run.    Pt states that he needed to be seen before your 1st available on Monday 12/11/2023.

## 2023-12-08 NOTE — TELEPHONE ENCOUNTER
Left message for call back.  Per Dr Frazier:   Can be seen at  if he feels it's a strep emergency.  smg

## 2023-12-11 NOTE — TELEPHONE ENCOUNTER
Pt calls back to state hi problem has resolved and he no longer needs the call back . No further action is required

## 2024-01-16 ENCOUNTER — PROCEDURE VISIT (OUTPATIENT)
Dept: FAMILY MEDICINE CLINIC | Age: 34
End: 2024-01-16

## 2024-01-16 VITALS
HEIGHT: 72 IN | HEART RATE: 70 BPM | BODY MASS INDEX: 26.68 KG/M2 | OXYGEN SATURATION: 98 % | RESPIRATION RATE: 18 BRPM | SYSTOLIC BLOOD PRESSURE: 131 MMHG | DIASTOLIC BLOOD PRESSURE: 70 MMHG | WEIGHT: 197 LBS

## 2024-01-16 DIAGNOSIS — F40.248 OTHER SITUATIONAL TYPE PHOBIA: ICD-10-CM

## 2024-01-16 DIAGNOSIS — Z87.898 HISTORY OF BENZODIAZEPINE USE: ICD-10-CM

## 2024-01-16 DIAGNOSIS — L91.8 SKIN TAG: ICD-10-CM

## 2024-01-16 DIAGNOSIS — F41.9 ANXIETY DISORDER, UNSPECIFIED TYPE: Primary | ICD-10-CM

## 2024-01-16 RX ORDER — CLONAZEPAM 0.25 MG/1
TABLET, ORALLY DISINTEGRATING ORAL
Qty: 10 TABLET | Refills: 0 | Status: SHIPPED | OUTPATIENT
Start: 2024-01-16 | End: 2024-04-01

## 2024-01-16 RX ORDER — FLUTICASONE PROPIONATE 50 MCG
1 SPRAY, SUSPENSION (ML) NASAL DAILY
COMMUNITY

## 2024-01-16 ASSESSMENT — PATIENT HEALTH QUESTIONNAIRE - PHQ9
4. FEELING TIRED OR HAVING LITTLE ENERGY: 0
5. POOR APPETITE OR OVEREATING: 0
9. THOUGHTS THAT YOU WOULD BE BETTER OFF DEAD, OR OF HURTING YOURSELF: 0
7. TROUBLE CONCENTRATING ON THINGS, SUCH AS READING THE NEWSPAPER OR WATCHING TELEVISION: 0
SUM OF ALL RESPONSES TO PHQ9 QUESTIONS 1 & 2: 0
8. MOVING OR SPEAKING SO SLOWLY THAT OTHER PEOPLE COULD HAVE NOTICED. OR THE OPPOSITE, BEING SO FIGETY OR RESTLESS THAT YOU HAVE BEEN MOVING AROUND A LOT MORE THAN USUAL: 0
SUM OF ALL RESPONSES TO PHQ QUESTIONS 1-9: 0
SUM OF ALL RESPONSES TO PHQ QUESTIONS 1-9: 0
2. FEELING DOWN, DEPRESSED OR HOPELESS: 0
3. TROUBLE FALLING OR STAYING ASLEEP: 0
1. LITTLE INTEREST OR PLEASURE IN DOING THINGS: 0
SUM OF ALL RESPONSES TO PHQ QUESTIONS 1-9: 0
10. IF YOU CHECKED OFF ANY PROBLEMS, HOW DIFFICULT HAVE THESE PROBLEMS MADE IT FOR YOU TO DO YOUR WORK, TAKE CARE OF THINGS AT HOME, OR GET ALONG WITH OTHER PEOPLE: 0
SUM OF ALL RESPONSES TO PHQ QUESTIONS 1-9: 0
6. FEELING BAD ABOUT YOURSELF - OR THAT YOU ARE A FAILURE OR HAVE LET YOURSELF OR YOUR FAMILY DOWN: 0

## 2024-01-16 NOTE — PROGRESS NOTES
\"Have you been to the ER, urgent care clinic since your last visit?  Hospitalized since your last visit?\"    NO    “Have you seen or consulted any other health care providers outside of [facility] since your last visit?”    NO           
normal.         Assessment/Plan:   Diagnosis Orders   1. Anxiety disorder, unspecified type  clonazePAM (KLONOPIN) 0.25 MG disintegrating tablet    [facility], Urine  Okay on other refills.   No new complaints/concerns, stable.      2. Other situational type phobia  clonazePAM (KLONOPIN) 0.25 MG disintegrating tablet    [facility], Urine      3. Skin tag  NH REMOVAL SKN TAGS MLT FIBRQ TAGS ANY AREA UPW/15  treated with liquid nitrogen in a double freeze/thaw technique.    - Blister expectations discussed. Pt tolerated w/o difficulty.         4. History of benzodiazepine use  [facility], Urine  Continue prn limited use only.         reviewed.  Return in about 6 months (around 7/16/2024), or sooner prn.  Seek care in interim for any new sxs or other concerns.  Pt verbalizes understanding and agrees with the plan.    Bailee Cristina PA-C

## 2024-01-19 LAB — DRUGS UR: NORMAL

## 2024-04-16 ENCOUNTER — CLINICAL DOCUMENTATION (OUTPATIENT)
Dept: FAMILY MEDICINE CLINIC | Age: 34
End: 2024-04-16

## 2024-04-16 DIAGNOSIS — F41.9 ANXIETY DISORDER, UNSPECIFIED TYPE: ICD-10-CM

## 2024-04-16 RX ORDER — ESCITALOPRAM OXALATE 20 MG/1
20 TABLET ORAL DAILY
Qty: 90 TABLET | Refills: 0 | Status: SHIPPED | OUTPATIENT
Start: 2024-04-16

## 2024-04-16 NOTE — TELEPHONE ENCOUNTER
Requested Prescriptions     Pending Prescriptions Disp Refills    escitalopram (LEXAPRO) 20 MG tablet 90 tablet 1     Sig: Take 1 tablet by mouth daily     Last seen:  12/4/23

## 2024-04-22 DIAGNOSIS — F41.9 ANXIETY DISORDER, UNSPECIFIED TYPE: ICD-10-CM

## 2024-04-22 RX ORDER — ESCITALOPRAM OXALATE 20 MG/1
20 TABLET ORAL DAILY
Qty: 90 TABLET | Refills: 0 | OUTPATIENT
Start: 2024-04-22

## 2024-04-22 NOTE — TELEPHONE ENCOUNTER
Requested Prescriptions     Pending Prescriptions Disp Refills    escitalopram (LEXAPRO) 20 MG tablet 90 tablet 0     Sig: Take 1 tablet by mouth daily     Last seen:  12/4/23    Last filled:    escitalopram (LEXAPRO) 20 MG tablet [[id number]]    Order Details  Dose: 20 mg Route: Oral Frequency: DAILY   Dispense Quantity: 90 tablet Refills: 0          Sig: Take 1 tablet by mouth daily         Start Date: 04/16/24       I have re-faxed this written RX to [facility] in [facility].  [address] [place] [address]  P) [phone]  F) [phone]

## 2024-04-23 DIAGNOSIS — F41.9 ANXIETY DISORDER, UNSPECIFIED TYPE: ICD-10-CM

## 2024-04-23 RX ORDER — ESCITALOPRAM OXALATE 20 MG/1
20 TABLET ORAL DAILY
Qty: 90 TABLET | Refills: 0 | OUTPATIENT
Start: 2024-04-23

## 2024-04-23 NOTE — TELEPHONE ENCOUNTER
Requested Prescriptions     Pending Prescriptions Disp Refills    escitalopram (LEXAPRO) 20 MG tablet 90 tablet 0     Sig: Take 1 tablet by mouth daily     Last seen:  1/16/24    I have faxed this RX twice now.  I will call the pharmacy to verify receipt once they open at 9 am.    I have called and spoke to Dot at [facility] in [facility].  She confirms that they have received this RX and it is waiting to be filled.  No need for this request.

## 2024-07-17 DIAGNOSIS — F41.9 ANXIETY DISORDER, UNSPECIFIED TYPE: ICD-10-CM

## 2024-07-17 RX ORDER — ESCITALOPRAM OXALATE 20 MG/1
20 TABLET ORAL DAILY
Qty: 30 TABLET | Refills: 0 | Status: SHIPPED | OUTPATIENT
Start: 2024-07-17

## 2024-07-17 NOTE — TELEPHONE ENCOUNTER
Requested Prescriptions     Pending Prescriptions Disp Refills    escitalopram (LEXAPRO) 20 MG tablet [Pharmacy Med Name: Escitalopram Oxalate 20 MG Oral Tablet] 90 tablet 0     Sig: Take 1 tablet by mouth once daily     Last seen:  12/4/23